# Patient Record
Sex: FEMALE | Race: WHITE | Employment: FULL TIME | URBAN - METROPOLITAN AREA
[De-identification: names, ages, dates, MRNs, and addresses within clinical notes are randomized per-mention and may not be internally consistent; named-entity substitution may affect disease eponyms.]

---

## 2019-07-03 ENCOUNTER — HOSPITAL ENCOUNTER (OUTPATIENT)
Age: 44
Setting detail: SPECIMEN
Discharge: HOME | End: 2019-07-03
Payer: COMMERCIAL

## 2019-07-03 DIAGNOSIS — E55.9 VITAMIN D DEFICIENCY: ICD-10-CM

## 2019-07-03 DIAGNOSIS — Z13.220 SCREENING CHOLESTEROL LEVEL: ICD-10-CM

## 2019-07-03 DIAGNOSIS — E06.3 HASHIMOTO'S DISEASE: ICD-10-CM

## 2019-07-03 LAB
ABSOLUTE EOS #: 0.21 K/UL (ref 0–0.44)
ABSOLUTE IMMATURE GRANULOCYTE: <0.03 K/UL (ref 0–0.3)
ABSOLUTE LYMPH #: 2.1 K/UL (ref 1.1–3.7)
ABSOLUTE MONO #: 0.44 K/UL (ref 0.1–1.2)
ALBUMIN SERPL-MCNC: 4.5 G/DL (ref 3.5–5.2)
ALBUMIN/GLOBULIN RATIO: 1.8 (ref 1–2.5)
ALP BLD-CCNC: 54 U/L (ref 35–104)
ALT SERPL-CCNC: 14 U/L (ref 5–33)
ANION GAP SERPL CALCULATED.3IONS-SCNC: 11 MMOL/L (ref 9–17)
AST SERPL-CCNC: 19 U/L
BASOPHILS # BLD: 1 % (ref 0–2)
BASOPHILS ABSOLUTE: 0.05 K/UL (ref 0–0.2)
BILIRUB SERPL-MCNC: 0.45 MG/DL (ref 0.3–1.2)
BUN BLDV-MCNC: 10 MG/DL (ref 6–20)
BUN/CREAT BLD: NORMAL (ref 9–20)
CALCIUM SERPL-MCNC: 9.3 MG/DL (ref 8.6–10.4)
CHLORIDE BLD-SCNC: 101 MMOL/L (ref 98–107)
CHOLESTEROL/HDL RATIO: 3.2
CHOLESTEROL: 179 MG/DL
CO2: 28 MMOL/L (ref 20–31)
CREAT SERPL-MCNC: 0.61 MG/DL (ref 0.5–0.9)
DIFFERENTIAL TYPE: NORMAL
EOSINOPHILS RELATIVE PERCENT: 4 % (ref 1–4)
GFR AFRICAN AMERICAN: >60 ML/MIN
GFR NON-AFRICAN AMERICAN: >60 ML/MIN
GFR SERPL CREATININE-BSD FRML MDRD: NORMAL ML/MIN/{1.73_M2}
GFR SERPL CREATININE-BSD FRML MDRD: NORMAL ML/MIN/{1.73_M2}
GLUCOSE BLD-MCNC: 79 MG/DL (ref 70–99)
HCT VFR BLD CALC: 42.3 % (ref 36.3–47.1)
HDLC SERPL-MCNC: 56 MG/DL
HEMOGLOBIN: 13.6 G/DL (ref 11.9–15.1)
IMMATURE GRANULOCYTES: 0 %
LDL CHOLESTEROL: 117 MG/DL (ref 0–130)
LYMPHOCYTES # BLD: 41 % (ref 24–43)
MCH RBC QN AUTO: 30.1 PG (ref 25.2–33.5)
MCHC RBC AUTO-ENTMCNC: 32.2 G/DL (ref 28.4–34.8)
MCV RBC AUTO: 93.6 FL (ref 82.6–102.9)
MONOCYTES # BLD: 9 % (ref 3–12)
NRBC AUTOMATED: 0 PER 100 WBC
PDW BLD-RTO: 12.3 % (ref 11.8–14.4)
PLATELET # BLD: 269 K/UL (ref 138–453)
PLATELET ESTIMATE: NORMAL
PMV BLD AUTO: 10.5 FL (ref 8.1–13.5)
POTASSIUM SERPL-SCNC: 4.7 MMOL/L (ref 3.7–5.3)
RBC # BLD: 4.52 M/UL (ref 3.95–5.11)
RBC # BLD: NORMAL 10*6/UL
SEG NEUTROPHILS: 45 % (ref 36–65)
SEGMENTED NEUTROPHILS ABSOLUTE COUNT: 2.37 K/UL (ref 1.5–8.1)
SODIUM BLD-SCNC: 140 MMOL/L (ref 135–144)
THYROXINE, FREE: 1.46 NG/DL (ref 0.93–1.7)
TOTAL PROTEIN: 7 G/DL (ref 6.4–8.3)
TRIGL SERPL-MCNC: 30 MG/DL
TSH SERPL DL<=0.05 MIU/L-ACNC: 3.18 MIU/L (ref 0.3–5)
VITAMIN D 25-HYDROXY: 26.5 NG/ML (ref 30–100)
VLDLC SERPL CALC-MCNC: NORMAL MG/DL (ref 1–30)
WBC # BLD: 5.2 K/UL (ref 3.5–11.3)
WBC # BLD: NORMAL 10*3/UL

## 2020-07-24 ENCOUNTER — HOSPITAL ENCOUNTER (OUTPATIENT)
Age: 45
Setting detail: SPECIMEN
Discharge: HOME | End: 2020-07-24
Payer: COMMERCIAL

## 2020-07-24 LAB
ABSOLUTE EOS #: 0.28 K/UL (ref 0–0.44)
ABSOLUTE IMMATURE GRANULOCYTE: <0.03 K/UL (ref 0–0.3)
ABSOLUTE LYMPH #: 1.93 K/UL (ref 1.1–3.7)
ABSOLUTE MONO #: 0.37 K/UL (ref 0.1–1.2)
ALBUMIN SERPL-MCNC: 4.4 G/DL (ref 3.5–5.2)
ALBUMIN/GLOBULIN RATIO: 1.8 (ref 1–2.5)
ALP BLD-CCNC: 54 U/L (ref 35–104)
ALT SERPL-CCNC: 13 U/L (ref 5–33)
ANION GAP SERPL CALCULATED.3IONS-SCNC: 15 MMOL/L (ref 9–17)
AST SERPL-CCNC: 21 U/L
BASOPHILS # BLD: 1 % (ref 0–2)
BASOPHILS ABSOLUTE: 0.03 K/UL (ref 0–0.2)
BILIRUB SERPL-MCNC: 0.3 MG/DL (ref 0.3–1.2)
BUN BLDV-MCNC: 13 MG/DL (ref 6–20)
BUN/CREAT BLD: NORMAL (ref 9–20)
CALCIUM SERPL-MCNC: 9.2 MG/DL (ref 8.6–10.4)
CHLORIDE BLD-SCNC: 104 MMOL/L (ref 98–107)
CHOLESTEROL/HDL RATIO: 3.1
CHOLESTEROL: 157 MG/DL
CO2: 24 MMOL/L (ref 20–31)
CREAT SERPL-MCNC: 0.58 MG/DL (ref 0.5–0.9)
DIFFERENTIAL TYPE: ABNORMAL
EOSINOPHILS RELATIVE PERCENT: 5 % (ref 1–4)
GFR AFRICAN AMERICAN: >60 ML/MIN
GFR NON-AFRICAN AMERICAN: >60 ML/MIN
GFR SERPL CREATININE-BSD FRML MDRD: NORMAL ML/MIN/{1.73_M2}
GFR SERPL CREATININE-BSD FRML MDRD: NORMAL ML/MIN/{1.73_M2}
GLUCOSE BLD-MCNC: 77 MG/DL (ref 70–99)
HCT VFR BLD CALC: 42.8 % (ref 36.3–47.1)
HDLC SERPL-MCNC: 50 MG/DL
HEMOGLOBIN: 14.1 G/DL (ref 11.9–15.1)
HIV AG/AB: NONREACTIVE
IMMATURE GRANULOCYTES: 0 %
LDL CHOLESTEROL: 99 MG/DL (ref 0–130)
LYMPHOCYTES # BLD: 35 % (ref 24–43)
MCH RBC QN AUTO: 31.3 PG (ref 25.2–33.5)
MCHC RBC AUTO-ENTMCNC: 32.9 G/DL (ref 28.4–34.8)
MCV RBC AUTO: 94.9 FL (ref 82.6–102.9)
MONOCYTES # BLD: 7 % (ref 3–12)
NRBC AUTOMATED: 0 PER 100 WBC
PDW BLD-RTO: 12.5 % (ref 11.8–14.4)
PLATELET # BLD: 275 K/UL (ref 138–453)
PLATELET ESTIMATE: ABNORMAL
PMV BLD AUTO: 10.5 FL (ref 8.1–13.5)
POTASSIUM SERPL-SCNC: 4.4 MMOL/L (ref 3.7–5.3)
RBC # BLD: 4.51 M/UL (ref 3.95–5.11)
RBC # BLD: ABNORMAL 10*6/UL
SEG NEUTROPHILS: 52 % (ref 36–65)
SEGMENTED NEUTROPHILS ABSOLUTE COUNT: 2.92 K/UL (ref 1.5–8.1)
SODIUM BLD-SCNC: 143 MMOL/L (ref 135–144)
THYROXINE, FREE: 1.52 NG/DL (ref 0.93–1.7)
TOTAL PROTEIN: 6.8 G/DL (ref 6.4–8.3)
TRIGL SERPL-MCNC: 40 MG/DL
TSH SERPL DL<=0.05 MIU/L-ACNC: 0.26 MIU/L (ref 0.3–5)
VITAMIN D 25-HYDROXY: 28.5 NG/ML (ref 30–100)
VLDLC SERPL CALC-MCNC: NORMAL MG/DL (ref 1–30)
WBC # BLD: 5.5 K/UL (ref 3.5–11.3)
WBC # BLD: ABNORMAL 10*3/UL

## 2020-11-16 ENCOUNTER — OFFICE VISIT (OUTPATIENT)
Dept: ORTHOPEDIC SURGERY | Age: 45
End: 2020-11-16
Payer: COMMERCIAL

## 2020-11-16 VITALS — WEIGHT: 118 LBS | HEIGHT: 67 IN | TEMPERATURE: 97.9 F | BODY MASS INDEX: 18.52 KG/M2

## 2020-11-16 PROCEDURE — 99203 OFFICE O/P NEW LOW 30 MIN: CPT | Performed by: ORTHOPAEDIC SURGERY

## 2020-11-16 PROCEDURE — G8427 DOCREV CUR MEDS BY ELIG CLIN: HCPCS | Performed by: ORTHOPAEDIC SURGERY

## 2020-11-16 PROCEDURE — 20610 DRAIN/INJ JOINT/BURSA W/O US: CPT | Performed by: ORTHOPAEDIC SURGERY

## 2020-11-16 PROCEDURE — 1036F TOBACCO NON-USER: CPT | Performed by: ORTHOPAEDIC SURGERY

## 2020-11-16 PROCEDURE — G8419 CALC BMI OUT NRM PARAM NOF/U: HCPCS | Performed by: ORTHOPAEDIC SURGERY

## 2020-11-16 PROCEDURE — G8484 FLU IMMUNIZE NO ADMIN: HCPCS | Performed by: ORTHOPAEDIC SURGERY

## 2020-11-16 RX ORDER — LIDOCAINE HYDROCHLORIDE 10 MG/ML
4 INJECTION, SOLUTION INFILTRATION; PERINEURAL ONCE
Status: COMPLETED | OUTPATIENT
Start: 2020-11-16 | End: 2020-11-16

## 2020-11-16 RX ORDER — TRIAMCINOLONE ACETONIDE 40 MG/ML
40 INJECTION, SUSPENSION INTRA-ARTICULAR; INTRAMUSCULAR ONCE
Status: COMPLETED | OUTPATIENT
Start: 2020-11-16 | End: 2020-11-16

## 2020-11-16 RX ADMIN — LIDOCAINE HYDROCHLORIDE 4 ML: 10 INJECTION, SOLUTION INFILTRATION; PERINEURAL at 09:50

## 2020-11-16 RX ADMIN — TRIAMCINOLONE ACETONIDE 40 MG: 40 INJECTION, SUSPENSION INTRA-ARTICULAR; INTRAMUSCULAR at 09:50

## 2020-11-16 NOTE — LETTER
11/16/2020    Tom Breaux, [address] [facility]  [address]  [place]    RE: Clinton Stevenson    Dear Dr. Sary Coleamn,    Thank you for allowing me to participate in the care of Ms. Espinal. I had the opportunity to evaluate the patient on 11/16/2020. Attached you will find my evaluation and recommendations. Thanks again for the confidence you have expressed in me by allowing my participation in the care of your patient. I will keep you apprised of further developments in the patients treatment course as it progresses. If I can be of further assistance in any fashion, please feel free to contact me at your convenience.     Sincerely,        Valeria Appiah  Shoulder and Elbow Surgery

## 2020-11-16 NOTE — PROGRESS NOTES
Orthopedic Knee Encounter Note     Chief complaint: Right knee pain    HPI: Bakari Rodriguez is a 50 y.o. female who presents for evaluation of her right knee which has been ongoing for about a year now. 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 Over time in any event she has noticed what she describes as an \"bubblegum\" in the posterior aspect of the knee that is gradually increase in size and has become more painful has well. Her pain diffusely involves the knee and is usually constant but progressively worsens as the day wears on. She also has associated numbness and tingling. Her pain occasionally radiates down the posterior aspect of her calf. She describes having some locking and popping sensations in the knee as well. Previous treatment:    NSAIDs: Ibuprofen and naproxen prn but tries to avoid this due to her GERD    Injections:  No    Physical therapy: No    Surgeries: None    Review of Systems:     Constitution: no fever or chills   Pain level: 6/10  Musculoskeletal: As noted in the HPI   Neurologic: numbness    Past Medical History  Macarena  has a past medical history of Anxiety, GERD (gastroesophageal reflux disease), Hashimoto's disease, and Migraine. Past Surgical History  Macarena  has a past surgical history that includes Tubal ligation and partial hysterectomy (cervix not removed). Current Medications  Current Outpatient Medications   Medication Sig Dispense Refill    LORazepam (ATIVAN) 0.5 MG tablet Take 1 tablet by mouth 2 times daily as needed for Anxiety for up to 30 days. 10 tablet 0    traMADol (ULTRAM) 50 MG tablet Take 1 tablet by mouth every 6 hours as needed for Pain for up to 7 days. Intended supply: 7 days.  Take lowest dose possible to manage pain 28 tablet 0    cabergoline (DOSTINEX) 0.5 MG tablet TAKE 1/2 TABLET BY MOUTH ONCE WEEKLY 8 tablet 0    levothyroxine (SYNTHROID) 100 MCG tablet TAKE 1 TABLET(100 MCG) BY MOUTH EVERY DAY 90 tablet 0    dexlansoprazole (DEXILANT) 60 MG CPDR delayed release capsule Take 60 mg by mouth daily      cetirizine (ZYRTEC) 10 MG tablet Take 10 mg by mouth daily      citalopram (CELEXA) 20 MG tablet Take 1 tablet by mouth daily (Patient not taking: Reported on 11/16/2020) 30 tablet 3    SUMAtriptan (IMITREX) 100 MG tablet Take 1 tablet by mouth once as needed for Migraine 9 tablet 2     No current facility-administered medications for this visit. Allergies  Allergies have been reviewed. Macarena is allergic to ibuprofen. Social History  Macarena  reports that she has quit smoking. She has never used smokeless tobacco. She reports that she does not drink alcohol or use drugs. Family History  Macarena's family history includes Cancer in her mother. Physical Exam:     Temp 97.9 °F (36.6 °C) (Infrared)   Ht 5' 7\" (1.702 m)   Wt 118 lb (53.5 kg)   BMI 18.48 kg/m²    General Appearance: alert, well appearing, and in no distress  Mental Status: alert, oriented to person, place, and time  Gait: normal  Hips: Good pain-free ROM without crepitation    Knee: Bilateral    Skin: warm and dry, no rash or erythema. Focal swelling over the posterior aspect of the right knee in the popliteal fossa. Palpable mass is present which is nonpulsatile. It is nontender to palpation. Is relatively soft.   Vasculature: 2+ pedal pulses bilaterally  Neuro: Sensation grossly intact to light touch diffusely  Alignment: Normal  Tenderness: Tender to palpation along medial and lateral joint lines of the right knee    ROM: (Degrees)    Right   A P   Left   A P    Extension  0    Extension  0   Flexion   140    Flexion   140      Crepitation  Yes    Crepitation  No      Muscle strength:    Right       Left    Flexion   5    Flexion   5  Extension  5    Extension  5  SLR   5    SLR   5    Extensor lag   n    Extensor lag  n      Special testing:    Right          Left    y    Pain with deep knee flexion   n  y    Patellar grind     n  n    Patellar apprehension    n  n    Patellar glide     n    n    Lachman     n  n    Anterior drawer    n  n    Pivot shift     n  n    Posterior drawer    n  n    Dial test     n  n    Posterolateral drawer    n  n    Posterior Sag     n  n    MCL      n  n    LCL      n    y    Medial joint line tenderness   n  y    Lateral joint line tenderness   n  n    Appley's     n      Imaging:  3 x-rays of the right knee completed on 9/25/2020 were reviewed demonstrating normal joint spaces and normal alignment without obvious fracture, dislocation, or subluxation. Mild peaking of the tibial eminences noted. Impression/Plan:     Mamie Domingo is a 50 y.o. old female with right knee pain and focal swelling over the posterior aspect of her knee that is consistent with Baker's cyst.  I had a discussion with the patient today educating her about this issue and discussed possible etiologies. We discussed treatment options available to her including nonoperative and operative intervention. At this time I recommended proceeding conservatively with an aspiration of her cyst as well as cortisone injection into her knee joint which she was amenable to. This was performed as outlined below. I have her follow-up in my clinic as needed but she was certainly encouraged to return or call at anytime with persistent or worsening symptoms and with any questions and/or concerns. Procedure: right intraarticular knee injection  Following an appropriate discussion with the patient regarding the risks and benefits of the procedure she consented to proceed. her right knee was prepped using chlorhexadine solution. Using aseptic technique and through a posterior approach her Baker's cyst was aspirated. I retrieved approximately 20 cc of clear straw-colored fluid.   The anterior aspect of her knee was then prepped using chlorhexidine and through a superolateral approach, her right knee joint was injected with a 5 cc mixture of 1cc 40mg/ml kenalog and 4 cc of 1% lidocaine without epinephrine. A band aid was applied to the injection site. she tolerated the injection with no immediate adverse reactions.     NA = Not assessed  n = No  y = Yes  SLR = Straight leg raise  MCL = Medial collateral ligament  LCL = Lateral collateral ligament

## 2021-02-08 ENCOUNTER — OFFICE VISIT (OUTPATIENT)
Dept: ORTHOPEDIC SURGERY | Age: 46
End: 2021-02-08
Payer: COMMERCIAL

## 2021-02-08 VITALS — RESPIRATION RATE: 16 BRPM | WEIGHT: 116 LBS | BODY MASS INDEX: 18.21 KG/M2 | TEMPERATURE: 99 F | HEIGHT: 67 IN

## 2021-02-08 DIAGNOSIS — M25.561 RIGHT KNEE PAIN, UNSPECIFIED CHRONICITY: Primary | ICD-10-CM

## 2021-02-08 PROCEDURE — G8484 FLU IMMUNIZE NO ADMIN: HCPCS | Performed by: ORTHOPAEDIC SURGERY

## 2021-02-08 PROCEDURE — 1036F TOBACCO NON-USER: CPT | Performed by: ORTHOPAEDIC SURGERY

## 2021-02-08 PROCEDURE — 99213 OFFICE O/P EST LOW 20 MIN: CPT | Performed by: ORTHOPAEDIC SURGERY

## 2021-02-08 PROCEDURE — G8427 DOCREV CUR MEDS BY ELIG CLIN: HCPCS | Performed by: ORTHOPAEDIC SURGERY

## 2021-02-08 PROCEDURE — G8419 CALC BMI OUT NRM PARAM NOF/U: HCPCS | Performed by: ORTHOPAEDIC SURGERY

## 2021-02-08 NOTE — PROGRESS NOTES
tablet by mouth Daily 90 tablet 1    sertraline (ZOLOFT) 50 MG tablet Take 1 tablet by mouth daily 90 tablet 1    citalopram (CELEXA) 20 MG tablet Take 1 tablet by mouth daily (Patient not taking: Reported on 11/16/2020) 30 tablet 3    cetirizine (ZYRTEC) 10 MG tablet Take 10 mg by mouth daily       No current facility-administered medications for this visit. Allergies  Allergies have been reviewed. Macarena is allergic to ibuprofen. Social History  Macarena  reports that she has quit smoking. She has never used smokeless tobacco. She reports that she does not drink alcohol or use drugs. Family History  Macarena's family history includes Cancer in her mother. Physical Exam:     There were no vitals taken for this visit.    General Appearance: alert, well appearing, and in no distress  Mental Status: alert, oriented to person, place, and time  Gait: normal  Hips: Good pain-free ROM without crepitation    Knee: Bilateral    Skin: warm and dry, no rash or erythema  Vasculature: 2+ pedal pulses bilaterally  Neuro: Sensation grossly intact to light touch diffusely  Alignment: Normal  Tenderness: Tender to palpation along the medial joint line of the right knee    ROM: (Degrees)    Right   A P   Left   A P    Extension  0    Extension  0   Flexion   135    Flexion   140      Crepitation  No    Crepitation  No      Muscle strength:    Right       Left    Flexion   5    Flexion   5  Extension  5    Extension  5  SLR   5    SLR   5    Extensor lag   n    Extensor lag  n      Special testing:    Right          Left    y    Pain with deep knee flexion   n  n    Patellar grind     n  n    Patellar apprehension    n  n    Patellar glide     n    n    Lachman     n  n    Anterior drawer    n  n    Pivot shift     n  n    Posterior drawer    n  n    Dial test     n  n    Posterolateral drawer    n  n    Posterior Sag     n  n    MCL      n  n    LCL      n    y    Medial joint line tenderness   n  n    Lateral joint line tenderness   n  y    Gray's     chano    Impression/Plan:     David Cain is a 50 y.o. old female with recurrent right knee pain. As noted above she is undergone treatment with use of anti-inflammatories in addition to a cortisone injection. At this time we did have a discussion about possible etiologies for her pain. I am concerned that she may have a torn medial meniscus. She has had symptoms on and off for over a year. At this point I recommended proceeding with an MRI study for further evaluation. We will facilitate her getting that study completed and will call with results and additional treatment recommendations as indicated.       NA = Not assessed  n = No  y = Yes  SLR = Straight leg raise  MCL = Medial collateral ligament  LCL = Lateral collateral ligament

## 2021-02-26 ENCOUNTER — TELEPHONE (OUTPATIENT)
Dept: ORTHOPEDIC SURGERY | Age: 46
End: 2021-02-26

## 2021-02-26 ENCOUNTER — HOSPITAL ENCOUNTER (OUTPATIENT)
Dept: MRI IMAGING | Age: 46
Discharge: HOME | End: 2021-02-28
Payer: COMMERCIAL

## 2021-02-26 DIAGNOSIS — M25.561 RIGHT KNEE PAIN, UNSPECIFIED CHRONICITY: ICD-10-CM

## 2021-02-26 PROCEDURE — 73721 MRI JNT OF LWR EXTRE W/O DYE: CPT

## 2021-02-26 NOTE — TELEPHONE ENCOUNTER
Please inform patient I reviewed her MRI and while she does have a fairly large baker's cyst I do not see any significant intra-articular pathology that I would recommend surgery for at this time. Consequently, we can schedule her to come in at her convenience for another injection and to review the MRI images.

## 2021-02-26 NOTE — TELEPHONE ENCOUNTER
Patient completed right knee MRI on 2/26. Results listed below. Please advise on findings and next step for patient. 2 mm focus of near fluid signal intensity seen along the anterior horn of the   medial meniscus may reflect parameniscal cyst formation without discrete   fluid-filled meniscal tear appreciated.       Large Baker's cyst measuring approximately 5 cm in the craniocaudal dimension.       Foci of mild articular cartilage degeneration seen along the central patellar   eminence.

## 2021-03-04 NOTE — TELEPHONE ENCOUNTER
Results reviewed with patient and scheduled a f/u on 3/8 at [facility] at 4:15pm to go over MRI results and to get another injection.

## 2021-03-08 ENCOUNTER — OFFICE VISIT (OUTPATIENT)
Dept: ORTHOPEDIC SURGERY | Age: 46
End: 2021-03-08
Payer: COMMERCIAL

## 2021-03-08 VITALS — TEMPERATURE: 97.7 F | BODY MASS INDEX: 18.21 KG/M2 | WEIGHT: 116 LBS | HEIGHT: 67 IN | RESPIRATION RATE: 14 BRPM

## 2021-03-08 DIAGNOSIS — M71.21 BAKER'S CYST OF KNEE, RIGHT: ICD-10-CM

## 2021-03-08 DIAGNOSIS — M25.561 RIGHT KNEE PAIN, UNSPECIFIED CHRONICITY: Primary | ICD-10-CM

## 2021-03-08 PROCEDURE — 20610 DRAIN/INJ JOINT/BURSA W/O US: CPT | Performed by: ORTHOPAEDIC SURGERY

## 2021-03-08 RX ORDER — TRIAMCINOLONE ACETONIDE 40 MG/ML
40 INJECTION, SUSPENSION INTRA-ARTICULAR; INTRAMUSCULAR ONCE
Status: COMPLETED | OUTPATIENT
Start: 2021-03-08 | End: 2021-03-08

## 2021-03-08 RX ORDER — LIDOCAINE HYDROCHLORIDE 10 MG/ML
3 INJECTION, SOLUTION INFILTRATION; PERINEURAL ONCE
Status: COMPLETED | OUTPATIENT
Start: 2021-03-08 | End: 2021-03-08

## 2021-03-08 RX ADMIN — LIDOCAINE HYDROCHLORIDE 3 ML: 10 INJECTION, SOLUTION INFILTRATION; PERINEURAL at 17:12

## 2021-03-08 RX ADMIN — TRIAMCINOLONE ACETONIDE 40 MG: 40 INJECTION, SUSPENSION INTRA-ARTICULAR; INTRAMUSCULAR at 17:13

## 2021-03-08 NOTE — PROGRESS NOTES
HPI: Ms. Colonel Salas is here today for evaluation of her right knee. She did have an MRI study completed on 2/26/2021. I did review the MRI images with her today and it demonstrates a moderately sized posterior medial popliteal cyst.  Intra-articularly her medial and lateral menisci appear to be intact. No obvious chondral injury. ACL and PCL are both intact. I had a discussion with the patient today with regards to her MRI findings. As noted above there does not appear to be any significant intra-articular pathology. Consequently in discussing treatment options moving forward I did recommend attempting a repeat aspiration of the cyst and this time injecting the cyst with a cortisone. She was amenable to this and had the procedure performed as outlined below. We will have her follow-up my clinic as needed but she may certainly return or call at anytime with questions under concerns. Procedure: right knee popliteal cyst aspiration and injection  Following an appropriate discussion with the patient regarding the risks and benefits of the procedure she consented to proceed. her right knee was prepped using chlorhexadine solution. Using aseptic technique and through a posterior approach, her right knee popliteal cyst was palpated and aspirated retrieving approximately 13 cc of clear straw-colored fluid. The cyst was then injected with a 4 cc mixture of 1cc 40mg/ml kenalog and 3 cc of 1% lidocaine without epinephrine. A band aid was applied to the injection site. she tolerated the injection with no immediate adverse reactions.

## 2021-05-12 ENCOUNTER — HOSPITAL ENCOUNTER (OUTPATIENT)
Age: 46
Setting detail: SPECIMEN
Discharge: HOME | End: 2021-05-12
Payer: COMMERCIAL

## 2021-05-12 DIAGNOSIS — E06.3 HASHIMOTO'S DISEASE: ICD-10-CM

## 2021-05-12 DIAGNOSIS — Z00.00 WELL ADULT HEALTH CHECK: ICD-10-CM

## 2021-05-12 LAB
ALBUMIN SERPL-MCNC: 4.4 G/DL (ref 3.5–5.2)
ALBUMIN/GLOBULIN RATIO: 1.7 (ref 1–2.5)
ALP BLD-CCNC: 66 U/L (ref 35–104)
ALT SERPL-CCNC: 16 U/L (ref 5–33)
ANION GAP SERPL CALCULATED.3IONS-SCNC: 9 MMOL/L (ref 9–17)
AST SERPL-CCNC: 20 U/L
BILIRUB SERPL-MCNC: <0.1 MG/DL (ref 0.3–1.2)
BUN BLDV-MCNC: 8 MG/DL (ref 6–20)
BUN/CREAT BLD: ABNORMAL (ref 9–20)
CALCIUM SERPL-MCNC: 9.2 MG/DL (ref 8.6–10.4)
CHLORIDE BLD-SCNC: 103 MMOL/L (ref 98–107)
CO2: 28 MMOL/L (ref 20–31)
CREAT SERPL-MCNC: 0.49 MG/DL (ref 0.5–0.9)
GFR AFRICAN AMERICAN: >60 ML/MIN
GFR NON-AFRICAN AMERICAN: >60 ML/MIN
GFR SERPL CREATININE-BSD FRML MDRD: ABNORMAL ML/MIN/{1.73_M2}
GFR SERPL CREATININE-BSD FRML MDRD: ABNORMAL ML/MIN/{1.73_M2}
GLUCOSE BLD-MCNC: 73 MG/DL (ref 70–99)
HCT VFR BLD CALC: 39.5 % (ref 36.3–47.1)
HEMOGLOBIN: 12.6 G/DL (ref 11.9–15.1)
MCH RBC QN AUTO: 30.7 PG (ref 25.2–33.5)
MCHC RBC AUTO-ENTMCNC: 31.9 G/DL (ref 28.4–34.8)
MCV RBC AUTO: 96.1 FL (ref 82.6–102.9)
NRBC AUTOMATED: 0 PER 100 WBC
PDW BLD-RTO: 12.1 % (ref 11.8–14.4)
PLATELET # BLD: 309 K/UL (ref 138–453)
PMV BLD AUTO: 9.5 FL (ref 8.1–13.5)
POTASSIUM SERPL-SCNC: 4.7 MMOL/L (ref 3.7–5.3)
RBC # BLD: 4.11 M/UL (ref 3.95–5.11)
SODIUM BLD-SCNC: 140 MMOL/L (ref 135–144)
THYROXINE, FREE: 1.2 NG/DL (ref 0.93–1.7)
TOTAL PROTEIN: 7 G/DL (ref 6.4–8.3)
TSH SERPL DL<=0.05 MIU/L-ACNC: 0.43 MIU/L (ref 0.3–5)
WBC # BLD: 6.7 K/UL (ref 3.5–11.3)

## 2021-05-14 ENCOUNTER — OFFICE VISIT (OUTPATIENT)
Dept: ORTHOPEDIC SURGERY | Age: 46
End: 2021-05-14
Payer: COMMERCIAL

## 2021-05-14 VITALS — HEIGHT: 66 IN | BODY MASS INDEX: 18.64 KG/M2 | WEIGHT: 116 LBS

## 2021-05-14 DIAGNOSIS — M71.21 BAKER'S CYST OF KNEE, RIGHT: Primary | ICD-10-CM

## 2021-05-14 PROCEDURE — G8420 CALC BMI NORM PARAMETERS: HCPCS | Performed by: ORTHOPAEDIC SURGERY

## 2021-05-14 PROCEDURE — 1036F TOBACCO NON-USER: CPT | Performed by: ORTHOPAEDIC SURGERY

## 2021-05-14 PROCEDURE — 99213 OFFICE O/P EST LOW 20 MIN: CPT | Performed by: ORTHOPAEDIC SURGERY

## 2021-05-14 PROCEDURE — 20610 DRAIN/INJ JOINT/BURSA W/O US: CPT | Performed by: ORTHOPAEDIC SURGERY

## 2021-05-14 PROCEDURE — G8427 DOCREV CUR MEDS BY ELIG CLIN: HCPCS | Performed by: ORTHOPAEDIC SURGERY

## 2021-05-14 NOTE — PROGRESS NOTES
Orthopedic Knee Encounter Note     Chief complaint: Right knee pain    HPI: Los Mata is a 50 y.o. female who presents for reevaluation of her right knee. She has been seen several times for this knee now. She has been dealing with a painful popliteal cyst.  She is had an MRI study that is failed to demonstrate any significant intra-articular pathology. During her last visit she did have the cyst aspirated and received a cortisone injection into the joint. She states that the injection helped for only a short while with pain and recurrence of the cyst about a week to 2 after the injection. At this time she continues to have recurrent pain over the posterior aspect of the knee radiating distally into the calf region. She denies having any fevers, chills, sweats or any constitutional symptoms    Previous treatment:    NSAIDs: Ibuprofen, naproxen    Injections: Right knee cortisone injections on 11/16/2020 and 3/8/2021    Physical therapy: No    Surgeries: None    Review of Systems:     Constitution: no fever or chills   Pain level: 5/10  Musculoskeletal: As noted in the HPI   Neurologic: no neurologic symptoms    Past Medical History  Macarena  has a past medical history of Anxiety, GERD (gastroesophageal reflux disease), Hashimoto's disease, and Migraine. Past Surgical History  Macarena  has a past surgical history that includes Tubal ligation and partial hysterectomy (cervix not removed).     Current Medications  Current Outpatient Medications   Medication Sig Dispense Refill    tiZANidine (ZANAFLEX) 4 MG tablet Take 1 tablet by mouth 3 times daily as needed (muscle spasms) 90 tablet 0    omeprazole (PRILOSEC) 20 MG delayed release capsule Take 1 capsule by mouth every morning (before breakfast) 90 capsule 0    LORazepam (ATIVAN) 0.5 MG tablet TAKE ONE TABLET BY MOUTH TWICE A DAY AS NEEDED FOR ANXIETY 30 tablet 0    cabergoline (DOSTINEX) 0.5 MG tablet TAKE 1/2 TABLET BY MOUTH ONCE WEEKLY 3 tablet 11    sertraline (ZOLOFT) 100 MG tablet Take 1 tablet by mouth daily 90 tablet 1    diclofenac sodium (VOLTAREN) 1 % GEL Apply 4 g topically 2 times daily 150 g 1    dexlansoprazole (DEXILANT) 60 MG CPDR delayed release capsule Take 1 capsule by mouth daily 90 capsule 1    levothyroxine (SYNTHROID) 100 MCG tablet Take 1 tablet by mouth Daily 90 tablet 1    cetirizine (ZYRTEC) 10 MG tablet Take 10 mg by mouth daily      SUMAtriptan (IMITREX) 100 MG tablet Take 1 tablet by mouth once as needed for Migraine 12 tablet 5     No current facility-administered medications for this visit. Allergies  Allergies have been reviewed. Macarena is allergic to ibuprofen. Social History  Macarena  reports that she quit smoking about 22 years ago. Her smoking use included cigarettes. She has a 13.00 pack-year smoking history. She has never used smokeless tobacco. She reports that she does not drink alcohol or use drugs. Family History  Macarena's family history includes Cancer in her mother. Physical Exam:     Ht 5' 6\" (1.676 m)   Wt 116 lb (52.6 kg)   BMI 18.72 kg/m²    General Appearance: alert, well appearing, and in no distress  Mental Status: alert, oriented to person, place, and time  Gait: antalgic  Hips: Good pain-free ROM without crepitation    Knee: Bilateral    Skin: warm and dry, no rash or erythema.   No right knee effusion but she does have a large popliteal cyst.  Vasculature: 2+ pedal pulses bilaterally  Neuro: Sensation grossly intact to light touch diffusely  Alignment: Normal  Tenderness: Mildly tender to palpation along the medial joint line of the right knee    ROM: (Degrees)    Right   A P   Left   A P    Extension  0    Extension  0   Flexion   125    Flexion   135      Crepitation  No    Crepitation  No      Muscle strength:    Right       Left    Flexion   5    Flexion   5  Extension  5    Extension  5  SLR   5    SLR   5    Extensor lag   n    Extensor lag  n      Special through a posterior approach, her right knee popliteal cyst was palpated and aspirated retrieving approximately 25 cc of clear straw-colored fluid. A band aid was applied to the injection site.  she tolerated the injection with no immediate adverse reactions.         NA = Not assessed  n = No  y = Yes  SLR = Straight leg raise  MCL = Medial collateral ligament  LCL = Lateral collateral ligament

## 2021-06-01 ENCOUNTER — HOSPITAL ENCOUNTER (OUTPATIENT)
Dept: PREADMISSION TESTING | Age: 46
Discharge: HOME | End: 2021-06-01

## 2021-06-01 ENCOUNTER — HOSPITAL ENCOUNTER (OUTPATIENT)
Age: 46
Discharge: HOME | End: 2021-06-01
Payer: COMMERCIAL

## 2021-06-01 DIAGNOSIS — M71.21 BAKER'S CYST OF KNEE, RIGHT: ICD-10-CM

## 2021-06-01 LAB
ANION GAP SERPL CALCULATED.3IONS-SCNC: 8 MMOL/L (ref 9–17)
BUN BLDV-MCNC: 10 MG/DL (ref 6–20)
BUN/CREAT BLD: ABNORMAL (ref 9–20)
CALCIUM SERPL-MCNC: 8.9 MG/DL (ref 8.6–10.4)
CHLORIDE BLD-SCNC: 100 MMOL/L (ref 98–107)
CO2: 29 MMOL/L (ref 20–31)
CREAT SERPL-MCNC: 0.55 MG/DL (ref 0.5–0.9)
GFR AFRICAN AMERICAN: >60 ML/MIN
GFR NON-AFRICAN AMERICAN: >60 ML/MIN
GFR SERPL CREATININE-BSD FRML MDRD: ABNORMAL ML/MIN/{1.73_M2}
GFR SERPL CREATININE-BSD FRML MDRD: ABNORMAL ML/MIN/{1.73_M2}
GLUCOSE BLD-MCNC: 79 MG/DL (ref 70–99)
HCT VFR BLD CALC: 40.4 % (ref 36.3–47.1)
HEMOGLOBIN: 13.2 G/DL (ref 11.9–15.1)
MCH RBC QN AUTO: 30.7 PG (ref 25.2–33.5)
MCHC RBC AUTO-ENTMCNC: 32.7 G/DL (ref 28.4–34.8)
MCV RBC AUTO: 94 FL (ref 82.6–102.9)
NRBC AUTOMATED: 0 PER 100 WBC
PDW BLD-RTO: 12.3 % (ref 11.8–14.4)
PLATELET # BLD: 257 K/UL (ref 138–453)
PMV BLD AUTO: 10.1 FL (ref 8.1–13.5)
POTASSIUM SERPL-SCNC: 4.1 MMOL/L (ref 3.7–5.3)
RBC # BLD: 4.3 M/UL (ref 3.95–5.11)
SODIUM BLD-SCNC: 137 MMOL/L (ref 135–144)
WBC # BLD: 8.6 K/UL (ref 3.5–11.3)

## 2021-06-01 PROCEDURE — 85027 COMPLETE CBC AUTOMATED: CPT

## 2021-06-01 PROCEDURE — 80048 BASIC METABOLIC PNL TOTAL CA: CPT

## 2021-06-01 PROCEDURE — 36415 COLL VENOUS BLD VENIPUNCTURE: CPT

## 2021-06-11 ENCOUNTER — OFFICE VISIT (OUTPATIENT)
Dept: ORTHOPEDIC SURGERY | Age: 46
End: 2021-06-11

## 2021-06-11 VITALS — HEIGHT: 66 IN | BODY MASS INDEX: 18.64 KG/M2 | WEIGHT: 116 LBS

## 2021-06-11 DIAGNOSIS — M71.21 BAKER'S CYST OF KNEE, RIGHT: ICD-10-CM

## 2021-06-11 DIAGNOSIS — G89.18 POST-OP PAIN: Primary | ICD-10-CM

## 2021-06-11 PROCEDURE — PREOPEXAM PRE-OP EXAM: Performed by: ORTHOPAEDIC SURGERY

## 2021-06-11 RX ORDER — ONDANSETRON 4 MG/1
4 TABLET, FILM COATED ORAL DAILY PRN
Qty: 20 TABLET | Refills: 0 | Status: SHIPPED | OUTPATIENT
Start: 2021-06-11 | End: 2021-12-23

## 2021-06-11 RX ORDER — HYDROCODONE BITARTRATE AND ACETAMINOPHEN 5; 325 MG/1; MG/1
1 TABLET ORAL EVERY 4 HOURS PRN
Qty: 42 TABLET | Refills: 0 | Status: SHIPPED | OUTPATIENT
Start: 2021-06-11 | End: 2021-06-18

## 2021-06-11 NOTE — PROGRESS NOTES
HPI: Ms. Eugene Flanagan is a 64-year-old here today for preoperative visit regarding her right knee. She has painful popliteal cyst and has been treated extensively with several aspirations and cortisone injections as well as prescription strength anti-inflammatories. She is electing at this time to forego continued attempts at conservative management and proceed with surgical intervention by way of a right knee arthroscopic assessment and debridement. We once again discussed in detail what surgery would entail in terms of the procedure, expected outcome and postoperative recovery course. Evaluation of her right knee demonstrates intact skin without warmth or erythema. She does have moderately sized popliteal cyst. She has good range of motion and is nontender to palpation along joint lines. She was provided with her prescriptions for postoperative analgesics. All questions were appropriately answered. She has undergone appropriate preoperative medical clearance. We will proceed with surgery as currently scheduled.

## 2021-06-14 ENCOUNTER — ANESTHESIA EVENT (OUTPATIENT)
Dept: OPERATING ROOM | Age: 46
End: 2021-06-14
Payer: COMMERCIAL

## 2021-06-14 RX ORDER — SODIUM CHLORIDE 0.9 % (FLUSH) 0.9 %
10 SYRINGE (ML) INJECTION PRN
Status: CANCELLED | OUTPATIENT
Start: 2021-06-14

## 2021-06-14 RX ORDER — SODIUM CHLORIDE 0.9 % (FLUSH) 0.9 %
10 SYRINGE (ML) INJECTION EVERY 12 HOURS SCHEDULED
Status: CANCELLED | OUTPATIENT
Start: 2021-06-14

## 2021-06-14 RX ORDER — ACETAMINOPHEN 325 MG/1
1000 TABLET ORAL ONCE
Status: CANCELLED | OUTPATIENT
Start: 2021-06-14 | End: 2021-06-14

## 2021-06-14 RX ORDER — SODIUM CHLORIDE 9 MG/ML
25 INJECTION, SOLUTION INTRAVENOUS PRN
Status: CANCELLED | OUTPATIENT
Start: 2021-06-14

## 2021-06-15 ENCOUNTER — HOSPITAL ENCOUNTER (OUTPATIENT)
Age: 46
Setting detail: OUTPATIENT SURGERY
Discharge: HOME | End: 2021-06-15
Attending: ORTHOPAEDIC SURGERY | Admitting: ORTHOPAEDIC SURGERY
Payer: COMMERCIAL

## 2021-06-15 ENCOUNTER — ANESTHESIA (OUTPATIENT)
Dept: OPERATING ROOM | Age: 46
End: 2021-06-15
Payer: COMMERCIAL

## 2021-06-15 VITALS — DIASTOLIC BLOOD PRESSURE: 78 MMHG | TEMPERATURE: 96.8 F | OXYGEN SATURATION: 100 % | SYSTOLIC BLOOD PRESSURE: 134 MMHG

## 2021-06-15 VITALS
DIASTOLIC BLOOD PRESSURE: 58 MMHG | HEART RATE: 88 BPM | BODY MASS INDEX: 18.28 KG/M2 | HEIGHT: 67 IN | RESPIRATION RATE: 18 BRPM | WEIGHT: 116.5 LBS | TEMPERATURE: 97.9 F | OXYGEN SATURATION: 94 % | SYSTOLIC BLOOD PRESSURE: 134 MMHG

## 2021-06-15 PROCEDURE — 2709999900 HC NON-CHARGEABLE SUPPLY: Performed by: ORTHOPAEDIC SURGERY

## 2021-06-15 PROCEDURE — 29875 ARTHRS KNEE SURG SYNVCT LMTD: CPT | Performed by: ORTHOPAEDIC SURGERY

## 2021-06-15 PROCEDURE — 6360000002 HC RX W HCPCS: Performed by: ORTHOPAEDIC SURGERY

## 2021-06-15 PROCEDURE — 7100000011 HC PHASE II RECOVERY - ADDTL 15 MIN: Performed by: ORTHOPAEDIC SURGERY

## 2021-06-15 PROCEDURE — 3700000000 HC ANESTHESIA ATTENDED CARE: Performed by: ORTHOPAEDIC SURGERY

## 2021-06-15 PROCEDURE — 6360000002 HC RX W HCPCS: Performed by: NURSE ANESTHETIST, CERTIFIED REGISTERED

## 2021-06-15 PROCEDURE — 6360000002 HC RX W HCPCS

## 2021-06-15 PROCEDURE — 2580000003 HC RX 258: Performed by: ANESTHESIOLOGY

## 2021-06-15 PROCEDURE — 2500000003 HC RX 250 WO HCPCS: Performed by: NURSE ANESTHETIST, CERTIFIED REGISTERED

## 2021-06-15 PROCEDURE — 6370000000 HC RX 637 (ALT 250 FOR IP)

## 2021-06-15 PROCEDURE — 3600000014 HC SURGERY LEVEL 4 ADDTL 15MIN: Performed by: ORTHOPAEDIC SURGERY

## 2021-06-15 PROCEDURE — 7100000010 HC PHASE II RECOVERY - FIRST 15 MIN: Performed by: ORTHOPAEDIC SURGERY

## 2021-06-15 PROCEDURE — 3600000004 HC SURGERY LEVEL 4 BASE: Performed by: ORTHOPAEDIC SURGERY

## 2021-06-15 PROCEDURE — 7100000000 HC PACU RECOVERY - FIRST 15 MIN: Performed by: ORTHOPAEDIC SURGERY

## 2021-06-15 PROCEDURE — 3700000001 HC ADD 15 MINUTES (ANESTHESIA): Performed by: ORTHOPAEDIC SURGERY

## 2021-06-15 PROCEDURE — 2580000003 HC RX 258: Performed by: ORTHOPAEDIC SURGERY

## 2021-06-15 PROCEDURE — 2500000003 HC RX 250 WO HCPCS: Performed by: ORTHOPAEDIC SURGERY

## 2021-06-15 RX ORDER — MEPERIDINE HYDROCHLORIDE 50 MG/ML
12.5 INJECTION INTRAMUSCULAR; INTRAVENOUS; SUBCUTANEOUS EVERY 5 MIN PRN
Status: DISCONTINUED | OUTPATIENT
Start: 2021-06-15 | End: 2021-06-15 | Stop reason: HOSPADM

## 2021-06-15 RX ORDER — SODIUM CHLORIDE 0.9 % (FLUSH) 0.9 %
10 SYRINGE (ML) INJECTION PRN
Status: DISCONTINUED | OUTPATIENT
Start: 2021-06-15 | End: 2021-06-15 | Stop reason: HOSPADM

## 2021-06-15 RX ORDER — HYDROCODONE BITARTRATE AND ACETAMINOPHEN 5; 325 MG/1; MG/1
1 TABLET ORAL PRN
Status: COMPLETED | OUTPATIENT
Start: 2021-06-15 | End: 2021-06-15

## 2021-06-15 RX ORDER — DEXAMETHASONE SODIUM PHOSPHATE 10 MG/ML
10 INJECTION, SOLUTION INTRAMUSCULAR; INTRAVENOUS ONCE
Status: COMPLETED | OUTPATIENT
Start: 2021-06-15 | End: 2021-06-15

## 2021-06-15 RX ORDER — LIDOCAINE HYDROCHLORIDE 10 MG/ML
1 INJECTION, SOLUTION EPIDURAL; INFILTRATION; INTRACAUDAL; PERINEURAL
Status: DISCONTINUED | OUTPATIENT
Start: 2021-06-15 | End: 2021-06-15 | Stop reason: HOSPADM

## 2021-06-15 RX ORDER — SODIUM CHLORIDE 9 MG/ML
25 INJECTION, SOLUTION INTRAVENOUS PRN
Status: DISCONTINUED | OUTPATIENT
Start: 2021-06-15 | End: 2021-06-15 | Stop reason: HOSPADM

## 2021-06-15 RX ORDER — PROPOFOL 10 MG/ML
INJECTION, EMULSION INTRAVENOUS PRN
Status: DISCONTINUED | OUTPATIENT
Start: 2021-06-15 | End: 2021-06-15 | Stop reason: SDUPTHER

## 2021-06-15 RX ORDER — SODIUM CHLORIDE 9 MG/ML
INJECTION INTRAVENOUS PRN
Status: DISCONTINUED | OUTPATIENT
Start: 2021-06-15 | End: 2021-06-15 | Stop reason: ALTCHOICE

## 2021-06-15 RX ORDER — SODIUM CHLORIDE, SODIUM LACTATE, POTASSIUM CHLORIDE, CALCIUM CHLORIDE 600; 310; 30; 20 MG/100ML; MG/100ML; MG/100ML; MG/100ML
INJECTION, SOLUTION INTRAVENOUS CONTINUOUS
Status: DISCONTINUED | OUTPATIENT
Start: 2021-06-15 | End: 2021-06-15 | Stop reason: HOSPADM

## 2021-06-15 RX ORDER — BUPIVACAINE HYDROCHLORIDE AND EPINEPHRINE 5; 5 MG/ML; UG/ML
INJECTION, SOLUTION EPIDURAL; INTRACAUDAL; PERINEURAL PRN
Status: DISCONTINUED | OUTPATIENT
Start: 2021-06-15 | End: 2021-06-15 | Stop reason: ALTCHOICE

## 2021-06-15 RX ORDER — EPHEDRINE SULFATE/0.9% NACL/PF 50 MG/5 ML
SYRINGE (ML) INTRAVENOUS PRN
Status: DISCONTINUED | OUTPATIENT
Start: 2021-06-15 | End: 2021-06-15 | Stop reason: SDUPTHER

## 2021-06-15 RX ORDER — ONDANSETRON 2 MG/ML
INJECTION INTRAMUSCULAR; INTRAVENOUS
Status: COMPLETED
Start: 2021-06-15 | End: 2021-06-15

## 2021-06-15 RX ORDER — FENTANYL CITRATE 50 UG/ML
INJECTION, SOLUTION INTRAMUSCULAR; INTRAVENOUS PRN
Status: DISCONTINUED | OUTPATIENT
Start: 2021-06-15 | End: 2021-06-15 | Stop reason: SDUPTHER

## 2021-06-15 RX ORDER — ONDANSETRON 2 MG/ML
INJECTION INTRAMUSCULAR; INTRAVENOUS PRN
Status: DISCONTINUED | OUTPATIENT
Start: 2021-06-15 | End: 2021-06-15 | Stop reason: SDUPTHER

## 2021-06-15 RX ORDER — BUPIVACAINE HYDROCHLORIDE AND EPINEPHRINE 5; 5 MG/ML; UG/ML
INJECTION, SOLUTION PERINEURAL
Status: DISCONTINUED
Start: 2021-06-15 | End: 2021-06-15 | Stop reason: HOSPADM

## 2021-06-15 RX ORDER — ONDANSETRON 2 MG/ML
4 INJECTION INTRAMUSCULAR; INTRAVENOUS
Status: COMPLETED | OUTPATIENT
Start: 2021-06-15 | End: 2021-06-15

## 2021-06-15 RX ORDER — MEPERIDINE HYDROCHLORIDE 50 MG/ML
INJECTION INTRAMUSCULAR; INTRAVENOUS; SUBCUTANEOUS
Status: DISCONTINUED
Start: 2021-06-15 | End: 2021-06-15 | Stop reason: HOSPADM

## 2021-06-15 RX ORDER — ACETAMINOPHEN 500 MG
TABLET ORAL
Status: COMPLETED
Start: 2021-06-15 | End: 2021-06-15

## 2021-06-15 RX ORDER — HYDRALAZINE HYDROCHLORIDE 20 MG/ML
5 INJECTION INTRAMUSCULAR; INTRAVENOUS EVERY 10 MIN PRN
Status: DISCONTINUED | OUTPATIENT
Start: 2021-06-15 | End: 2021-06-15 | Stop reason: HOSPADM

## 2021-06-15 RX ORDER — ACETAMINOPHEN 500 MG
1000 TABLET ORAL ONCE
Status: COMPLETED | OUTPATIENT
Start: 2021-06-15 | End: 2021-06-15

## 2021-06-15 RX ORDER — HYDROCODONE BITARTRATE AND ACETAMINOPHEN 5; 325 MG/1; MG/1
TABLET ORAL
Status: COMPLETED
Start: 2021-06-15 | End: 2021-06-15

## 2021-06-15 RX ORDER — PROMETHAZINE HYDROCHLORIDE 25 MG/ML
6.25 INJECTION, SOLUTION INTRAMUSCULAR; INTRAVENOUS
Status: DISCONTINUED | OUTPATIENT
Start: 2021-06-15 | End: 2021-06-15 | Stop reason: HOSPADM

## 2021-06-15 RX ORDER — MORPHINE SULFATE 1 MG/ML
1 INJECTION, SOLUTION EPIDURAL; INTRATHECAL; INTRAVENOUS EVERY 5 MIN PRN
Status: DISCONTINUED | OUTPATIENT
Start: 2021-06-15 | End: 2021-06-15 | Stop reason: HOSPADM

## 2021-06-15 RX ORDER — GLYCOPYRROLATE 1 MG/5 ML
SYRINGE (ML) INTRAVENOUS PRN
Status: DISCONTINUED | OUTPATIENT
Start: 2021-06-15 | End: 2021-06-15 | Stop reason: SDUPTHER

## 2021-06-15 RX ORDER — MIDAZOLAM HYDROCHLORIDE 1 MG/ML
INJECTION INTRAMUSCULAR; INTRAVENOUS PRN
Status: DISCONTINUED | OUTPATIENT
Start: 2021-06-15 | End: 2021-06-15 | Stop reason: SDUPTHER

## 2021-06-15 RX ORDER — SODIUM CHLORIDE 9 MG/ML
INJECTION INTRAVENOUS
Status: DISCONTINUED
Start: 2021-06-15 | End: 2021-06-15 | Stop reason: HOSPADM

## 2021-06-15 RX ORDER — DIPHENHYDRAMINE HYDROCHLORIDE 50 MG/ML
12.5 INJECTION INTRAMUSCULAR; INTRAVENOUS
Status: DISCONTINUED | OUTPATIENT
Start: 2021-06-15 | End: 2021-06-15 | Stop reason: HOSPADM

## 2021-06-15 RX ORDER — HYDROCODONE BITARTRATE AND ACETAMINOPHEN 5; 325 MG/1; MG/1
2 TABLET ORAL PRN
Status: COMPLETED | OUTPATIENT
Start: 2021-06-15 | End: 2021-06-15

## 2021-06-15 RX ORDER — DEXAMETHASONE SODIUM PHOSPHATE 10 MG/ML
INJECTION, SOLUTION INTRAMUSCULAR; INTRAVENOUS
Status: COMPLETED
Start: 2021-06-15 | End: 2021-06-15

## 2021-06-15 RX ORDER — LIDOCAINE HYDROCHLORIDE 10 MG/ML
INJECTION, SOLUTION EPIDURAL; INFILTRATION; INTRACAUDAL; PERINEURAL PRN
Status: DISCONTINUED | OUTPATIENT
Start: 2021-06-15 | End: 2021-06-15 | Stop reason: SDUPTHER

## 2021-06-15 RX ORDER — FENTANYL CITRATE 50 UG/ML
25 INJECTION, SOLUTION INTRAMUSCULAR; INTRAVENOUS EVERY 5 MIN PRN
Status: DISCONTINUED | OUTPATIENT
Start: 2021-06-15 | End: 2021-06-15 | Stop reason: HOSPADM

## 2021-06-15 RX ORDER — SODIUM CHLORIDE 0.9 % (FLUSH) 0.9 %
10 SYRINGE (ML) INJECTION EVERY 12 HOURS SCHEDULED
Status: DISCONTINUED | OUTPATIENT
Start: 2021-06-15 | End: 2021-06-15 | Stop reason: HOSPADM

## 2021-06-15 RX ADMIN — HYDROCODONE BITARTRATE AND ACETAMINOPHEN 1 TABLET: 5; 325 TABLET ORAL at 09:20

## 2021-06-15 RX ADMIN — SODIUM CHLORIDE, POTASSIUM CHLORIDE, SODIUM LACTATE AND CALCIUM CHLORIDE: 600; 310; 30; 20 INJECTION, SOLUTION INTRAVENOUS at 06:33

## 2021-06-15 RX ADMIN — DEXAMETHASONE SODIUM PHOSPHATE 10 MG: 10 INJECTION INTRAMUSCULAR; INTRAVENOUS at 07:41

## 2021-06-15 RX ADMIN — LIDOCAINE HYDROCHLORIDE 50 MG: 10 INJECTION, SOLUTION EPIDURAL; INFILTRATION; INTRACAUDAL; PERINEURAL at 07:26

## 2021-06-15 RX ADMIN — Medication 0.5 MG: at 08:43

## 2021-06-15 RX ADMIN — HYDROMORPHONE HYDROCHLORIDE 0.5 MG: 1 INJECTION, SOLUTION INTRAMUSCULAR; INTRAVENOUS; SUBCUTANEOUS at 08:43

## 2021-06-15 RX ADMIN — Medication 1000 MG: at 06:25

## 2021-06-15 RX ADMIN — HYDROMORPHONE HYDROCHLORIDE 0.5 MG: 1 INJECTION, SOLUTION INTRAMUSCULAR; INTRAVENOUS; SUBCUTANEOUS at 08:29

## 2021-06-15 RX ADMIN — Medication 10 MG: at 07:35

## 2021-06-15 RX ADMIN — FENTANYL CITRATE 50 MCG: 50 INJECTION, SOLUTION INTRAMUSCULAR; INTRAVENOUS at 07:26

## 2021-06-15 RX ADMIN — Medication 0.5 MG: at 08:29

## 2021-06-15 RX ADMIN — FENTANYL CITRATE 25 MCG: 50 INJECTION, SOLUTION INTRAMUSCULAR; INTRAVENOUS at 07:54

## 2021-06-15 RX ADMIN — ONDANSETRON 4 MG: 2 INJECTION INTRAMUSCULAR; INTRAVENOUS at 08:28

## 2021-06-15 RX ADMIN — ONDANSETRON 4 MG: 2 INJECTION INTRAMUSCULAR; INTRAVENOUS at 07:52

## 2021-06-15 RX ADMIN — ACETAMINOPHEN 1000 MG: 500 TABLET ORAL at 06:25

## 2021-06-15 RX ADMIN — CEFAZOLIN 2000 MG: 10 INJECTION, POWDER, FOR SOLUTION INTRAVENOUS at 07:31

## 2021-06-15 RX ADMIN — PROPOFOL 120 MG: 10 INJECTION, EMULSION INTRAVENOUS at 07:26

## 2021-06-15 RX ADMIN — FENTANYL CITRATE 25 MCG: 50 INJECTION, SOLUTION INTRAMUSCULAR; INTRAVENOUS at 07:49

## 2021-06-15 RX ADMIN — Medication 0.2 MG: at 07:38

## 2021-06-15 RX ADMIN — MIDAZOLAM HYDROCHLORIDE 2 MG: 1 INJECTION, SOLUTION INTRAMUSCULAR; INTRAVENOUS at 07:22

## 2021-06-15 ASSESSMENT — PULMONARY FUNCTION TESTS
PIF_VALUE: 2
PIF_VALUE: 12
PIF_VALUE: 12
PIF_VALUE: 1
PIF_VALUE: 13
PIF_VALUE: 1
PIF_VALUE: 1
PIF_VALUE: 12
PIF_VALUE: 13
PIF_VALUE: 12
PIF_VALUE: 4
PIF_VALUE: 12
PIF_VALUE: 1
PIF_VALUE: 12
PIF_VALUE: 13
PIF_VALUE: 10
PIF_VALUE: 1
PIF_VALUE: 12
PIF_VALUE: 13
PIF_VALUE: 11
PIF_VALUE: 12
PIF_VALUE: 12
PIF_VALUE: 13
PIF_VALUE: 13
PIF_VALUE: 12
PIF_VALUE: 1
PIF_VALUE: 4
PIF_VALUE: 12
PIF_VALUE: 13
PIF_VALUE: 13
PIF_VALUE: 12
PIF_VALUE: 12
PIF_VALUE: 13
PIF_VALUE: 12
PIF_VALUE: 11
PIF_VALUE: 12
PIF_VALUE: 12
PIF_VALUE: 10
PIF_VALUE: 12
PIF_VALUE: 1
PIF_VALUE: 2

## 2021-06-15 ASSESSMENT — PAIN - FUNCTIONAL ASSESSMENT: PAIN_FUNCTIONAL_ASSESSMENT: 0-10

## 2021-06-15 ASSESSMENT — PAIN SCALES - GENERAL
PAINLEVEL_OUTOF10: 0
PAINLEVEL_OUTOF10: 7
PAINLEVEL_OUTOF10: 4
PAINLEVEL_OUTOF10: 6
PAINLEVEL_OUTOF10: 8

## 2021-06-15 NOTE — OP NOTE
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 old female who presented with with right knee pain localized to posterior aspect of her knee associated with sizable popliteal cyst.  Having failed multiple attempts at managing this conservatively and following a discussion with the patient regarding both non-operative and operative treatment options, they consented to proceed with right knee arthroscopic cyst valve debridement. she came to this decision after demonstrating an understanding of our discussion regarding details of the procedure, risks and benefits, expected outcome, and postoperative course. Operative technique: Following appropriate identification of the patient and her operative extremity, consent was reviewed with the patient and her operative extremity was signed. she was wheeled to the operating room where she finished a course of pre-operative antibiotic prophylaxis by way of 2 g of IV Ancef. The anesthesia service administered a general anesthetic and secured her airway using an LMA. All bony prominences were appropriately padded and the patient was secured to the operative table in a supine position. A well padded pneumatic tourniquet was applied to the proximal aspect of the patient's right thigh.  The patient's operative extremity was prepped and draped in a standard sterile fashion and a time out was performed during which the correct patient, operative Stable

## 2021-06-15 NOTE — ANESTHESIA PRE PROCEDURE
Department of Anesthesiology  Preprocedure Note       Name:  Tashi Amaral   Age:  50 y.o.  :  1975                                          MRN:  [id number]         Date:  6/15/2021      Surgeon: Coreen Wilcox):  Vy Ch MD    Procedure: Procedure(s):  RIGHT KNEE ARTHROSCOPY WITH CYST DEBRIDEMENT    Medications prior to admission:   Prior to Admission medications    Medication Sig Start Date End Date Taking? Authorizing Provider   HYDROcodone-acetaminophen (NORCO) 5-325 MG per tablet Take 1 tablet by mouth every 4 hours as needed for Pain for up to 7 days.  21  Vy Ch MD   ondansetron (ZOFRAN) 4 MG tablet Take 1 tablet by mouth daily as needed for Nausea or Vomiting 21   Vy Ch MD   levothyroxine (SYNTHROID) 100 MCG tablet Take 1 tablet by mouth Daily 21   Benjamin Ibrahim MD   SUMAtriptan (IMITREX) 100 MG tablet Take 1 tablet by mouth once as needed for Migraine 21  Benjamin Ibrahim MD   tiZANidine (ZANAFLEX) 4 MG tablet Take 1 tablet by mouth 3 times daily as needed (muscle spasms) 21   Benjamin Ibrahim MD   omeprazole (PRILOSEC) 20 MG delayed release capsule Take 1 capsule by mouth every morning (before breakfast) 21   Benjamin Ibrahim MD   cabergoline (DOSTINEX) 0.5 MG tablet TAKE 1/2 TABLET BY MOUTH ONCE WEEKLY 21   Rita Colon APRN - CNP   sertraline (ZOLOFT) 100 MG tablet Take 1 tablet by mouth daily 21   Benjamin Ibrahim MD   diclofenac sodium (VOLTAREN) 1 % GEL Apply 4 g topically 2 times daily 21   Vy Ch MD   dexlansoprazole (DEXILANT) 60 MG CPDR delayed release capsule Take 1 capsule by mouth daily 21  ARJUN Guan - CNP   cetirizine (ZYRTEC) 10 MG tablet Take 10 mg by mouth daily    Historical Provider, MD       Current medications:    Current Facility-Administered Medications   Medication Dose Route Frequency Provider Last Rate Last Admin    lactated ringers infusion   Intravenous Continuous Susan Hager MD        sodium chloride flush 0.9 % injection 10 mL  10 mL Intravenous 2 times per day Susan Hager MD        sodium chloride flush 0.9 % injection 10 mL  10 mL Intravenous PRN Susan Hager MD        0.9 % sodium chloride infusion  25 mL Intravenous PRN Susan Hager MD        lidocaine PF 1 % injection 1 mL  1 mL Intradermal Once PRN Susan Hager MD        0.9 % sodium chloride infusion  25 mL Intravenous PRN Oskar Simon MD        acetaminophen (TYLENOL) tablet 1,000 mg  1,000 mg Oral Once Oskar Simon MD        ceFAZolin (ANCEF) 2000 mg in dextrose 5 % 50 mL IVPB  2,000 mg Intravenous On Call to [address] [place] [address], MD        dexamethasone (PF) (DECADRON) injection 10 mg  10 mg Intravenous Once Oskar Simon MD        sodium chloride flush 0.9 % injection 10 mL  10 mL Intravenous 2 times per day Oskar Simon MD        sodium chloride flush 0.9 % injection 10 mL  10 mL Intravenous PRN Oskar Simon MD        ceFAZolin (ANCEF) IVPB             dexamethasone (PF) (DECADRON) 10 MG/ML injection             acetaminophen (TYLENOL) 500 MG tablet                Allergies:     Allergies   Allergen Reactions    Ibuprofen Nausea And Vomiting, Nausea Only and Other (See Comments)     Gi issues (patient has GERD)         Problem List:    Patient Active Problem List   Diagnosis Code    Hashimoto's disease E06.3    GERD (gastroesophageal reflux disease) K21.9    Anxiety F41.9    Migraine G43.909    Baker's cyst of knee, right M71.21       Past Medical History:        Diagnosis Date    Anxiety     GERD (gastroesophageal reflux disease)     Hashimoto's disease     Migraine        Past Surgical History:        Procedure Laterality Date    HYSTERECTOMY      PARTIAL HYSTERECTOMY      TUBAL LIGATION      UPPER GASTROINTESTINAL ENDOSCOPY         Social History:    Social History     Tobacco Use    Smoking status: Former Smoker     Packs/day: 1.00 Years: 13.00     Pack years: 13.00     Types: Cigarettes     Quit date: 36     Years since quittin.4    Smokeless tobacco: Never Used   Substance Use Topics    Alcohol use: No                                Counseling given: Not Answered      Vital Signs (Current):   Vitals:    06/15/21 0601   Weight: 116 lb 8 oz (52.8 kg)   Height: 5' 7\" (1.702 m)                                              BP Readings from Last 3 Encounters:   21 120/80   19 100/62   19 110/62       NPO Status:                                                                                 BMI:   Wt Readings from Last 3 Encounters:   06/15/21 116 lb 8 oz (52.8 kg)   21 116 lb (52.6 kg)   21 116 lb (52.6 kg)     Body mass index is 18.25 kg/m². CBC:   Lab Results   Component Value Date    WBC 8.6 2021    RBC 4.30 2021    HGB 13.2 2021    HCT 40.4 2021    MCV 94.0 2021    RDW 12.3 2021     2021       CMP:   Lab Results   Component Value Date     2021    K 4.1 2021     2021    CO2 29 2021    BUN 10 2021    CREATININE 0.55 2021    GFRAA >60 2021    LABGLOM >60 2021    GLUCOSE 79 2021    PROT 7.0 2021    CALCIUM 8.9 2021    BILITOT <0.10 2021    ALKPHOS 66 2021    AST 20 2021    ALT 16 2021       POC Tests: No results for input(s): POCGLU, POCNA, POCK, POCCL, POCBUN, POCHEMO, POCHCT in the last 72 hours.     Coags: No results found for: PROTIME, INR, APTT    HCG (If Applicable): No results found for: PREGTESTUR, PREGSERUM, HCG, HCGQUANT     ABGs: No results found for: PHART, PO2ART, GNV3JLY, DVW1ZMQ, BEART, A8ZURBBV     Type & Screen (If Applicable):  No results found for: LABABO, LABRH    Drug/Infectious Status (If Applicable):  No results found for: HIV, HEPCAB    COVID-19 Screening (If Applicable): No results found for: COVID19        Anesthesia Evaluation Patient summary reviewed and Nursing notes reviewed no history of anesthetic complications:   Airway: Mallampati: I  TM distance: >3 FB   Neck ROM: full  Mouth opening: > = 3 FB Dental: normal exam         Pulmonary:normal exam  breath sounds clear to auscultation                             Cardiovascular:            Rhythm: regular  Rate: normal                    Neuro/Psych:   (+) headaches: migraine headaches, depression/anxiety             GI/Hepatic/Renal:   (+) GERD: no interval change,           Endo/Other:                      ROS comment: Hashimoto's disease Abdominal:       Abdomen: soft. Vascular:                                      Anesthesia Plan      general     ASA 2       Induction: intravenous. MIPS: Postoperative opioids intended and Prophylactic antiemetics administered. Anesthetic plan and risks discussed with patient.                       Tawny Cazares MD   6/15/2021

## 2021-06-15 NOTE — ANESTHESIA POSTPROCEDURE EVALUATION
Department of Anesthesiology  Postprocedure Note    Patient: Debra Blood  MRN: [id number]  YOB: 1975  Date of evaluation: 6/15/2021  Time:  8:31 AM     Procedure Summary     Date: 06/15/21 Room / Location: [facility] 01 / [address] [place] [address]    Anesthesia Start: 5297 Anesthesia Stop: 0815    Procedure: RIGHT KNEE ARTHROSCOPY WITH CYST DEBRIDEMENT (Right Knee) Diagnosis: (RIGHT KNEE CYST)    Surgeons: Hope Loving MD Responsible Provider: Elizabeth Schwartz MD    Anesthesia Type: general ASA Status: 2          Anesthesia Type: general    Kathi Phase I: Kathi Score: 8    Kathi Phase II:      Last vitals: Reviewed and per EMR flowsheets.        Anesthesia Post Evaluation    Patient location during evaluation: PACU  Patient participation: complete - patient participated  Level of consciousness: awake and alert  Airway patency: patent  Nausea & Vomiting: no nausea and no vomiting  Complications: no  Cardiovascular status: hemodynamically stable  Respiratory status: nasal cannula and spontaneous ventilation  Hydration status: euvolemic

## 2021-06-15 NOTE — BRIEF OP NOTE
Brief Postoperative Note      Patient: Alvarez Millard  YOB: 1975  MRN: [id number]    Date of Procedure: 6/15/2021    Pre-Op Diagnosis: RIGHT KNEE CYST    Post-Op Diagnosis: Same       Procedure(s):  RIGHT KNEE ARTHROSCOPY WITH CYST DEBRIDEMENT    Surgeon(s):  Levy Wagner MD    Assistant:  Deborah Sawant    Anesthesia: General    Estimated Blood Loss (mL): Minimal    Complications: None    Specimens:   * No specimens in log *    Implants:  * No implants in log *      Drains: * No LDAs found *    Findings: See operative report    Electronically signed by Levy Wagner MD on 6/15/2021 at 8:11 AM

## 2021-06-15 NOTE — H&P
Update History & Physical    The patient's History and Physical of June 8, 2021 was reviewed with the patient and I examined the patient. There was no change. The surgical site was confirmed by the patient and me. Plan: The risks, benefits, expected outcome, and alternative to the recommended procedure have been discussed with the patient. Patient understands and wants to proceed with the procedure.      Electronically signed by Justus John MD on 6/15/2021 at 6:49 AM

## 2021-06-16 ENCOUNTER — TELEPHONE (OUTPATIENT)
Dept: ORTHOPEDIC SURGERY | Age: 46
End: 2021-06-16

## 2021-06-16 NOTE — TELEPHONE ENCOUNTER
Patient called in concerned as she had knee surgery yesterday and was not given anything to change her dressing. PT has some questions please advise and address thank you!

## 2021-06-17 NOTE — TELEPHONE ENCOUNTER
Spoke with patient about her dressing concerns. She states that someone called her yesterday and informed her to purchase waterproof bandages. She states that all her questions have been answered.

## 2021-06-28 ENCOUNTER — OFFICE VISIT (OUTPATIENT)
Dept: ORTHOPEDIC SURGERY | Age: 46
End: 2021-06-28

## 2021-06-28 VITALS — WEIGHT: 112 LBS | HEIGHT: 67 IN | BODY MASS INDEX: 17.58 KG/M2

## 2021-06-28 DIAGNOSIS — Z98.890 STATUS POST ARTHROSCOPIC SURGERY OF RIGHT KNEE: Primary | ICD-10-CM

## 2021-06-28 PROCEDURE — 99024 POSTOP FOLLOW-UP VISIT: CPT | Performed by: ORTHOPAEDIC SURGERY

## 2021-06-28 NOTE — PROGRESS NOTES
Procedure: Right knee arthroscopic Baker's cyst debridement  Date of procedure: 6/15/2021    HPI: Ms. Alonzo Gambino is a 77-year-old who is approximately 2 weeks status post the aforementioned procedure. She indicates that she is doing relatively well. She states that she did a little too much a few days ago and had diffuse swelling about the knee but it this got better soon as she was able to rest.  She still has some discomfort in her knee that she rates as a 3/10. She also describes having some numbness and tingling over the kneecap anteriorly. She denies having any fevers, chills, sweats or any constitutional symptoms. Physical examination:  Evaluation patient's right knee and lower extremity demonstrates her portal incisions to be clean, dry, intact and healing appropriately. Mild diffuse swelling noted. Sensation is grossly intact light touch in all dermatomes and she has a 2+ pedal pulses with brisk capillary refill in her toes. Impression and plan: Ms. Alonzo Gambino is a 77-year-old who is approximately 2 weeks status post a right knee arthroscopic Baker's cyst debridement. She is doing fairly well at this time. Her sutures were taken out and Steri-Strips and clean dressing applied. She may now get her incisions wet in the shower but was instructed to avoid submerging them in a bath or any body of water for another 4 weeks. 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 No meniscal tears. At this time again to get her started in some formal physical therapy and a prescription was provided. She may continue weightbearing as tolerated. I will see her back for reevaluation in 4 weeks but she was encouraged to return or call earlier with any questions or concerns.

## 2021-06-29 DIAGNOSIS — M25.569 ACUTE KNEE PAIN, UNSPECIFIED LATERALITY: Primary | ICD-10-CM

## 2021-06-29 NOTE — TELEPHONE ENCOUNTER
Patient calling in, she was seen yesterday and she thought you was sending a script for her into [facility] on [facility], but nothing was sent yesterday. Your note: At this time again to get her started in some formal physical therapy and a prescription was provided.

## 2021-06-30 NOTE — TELEPHONE ENCOUNTER
Pt called back requesting a refill of her Norco for the pain that she has been having. She stated that she forgot to ask Dr. Eusebio Mckeon during the appointment on the 28th but later called back. She was very frustrated that it has taken this long to get medication to help relieve the pain.

## 2021-07-01 ENCOUNTER — HOSPITAL ENCOUNTER (OUTPATIENT)
Dept: PHYSICAL THERAPY | Facility: CLINIC | Age: 46
Setting detail: THERAPIES SERIES
Discharge: HOME | End: 2021-07-01
Payer: COMMERCIAL

## 2021-07-01 RX ORDER — HYDROCODONE BITARTRATE AND ACETAMINOPHEN 5; 325 MG/1; MG/1
1 TABLET ORAL EVERY 4 HOURS PRN
Qty: 30 TABLET | Refills: 0 | Status: SHIPPED | OUTPATIENT
Start: 2021-07-01 | End: 2021-07-08

## 2021-07-01 NOTE — TELEPHONE ENCOUNTER
Called pt and told her that Dr Dave Search additional pain meds. Those will be sent over to her pharmacy.

## 2021-07-01 NOTE — FLOWSHEET NOTE
[] [address] - [facility]EL[facility] &  Therapy  [address]    [phone]  F: [phone]   [x] [address] [facility] [address]   [address]  P: [phone]  F: [phone]  [] [address]  [address]  P: [phone]  F: [phone] [] [address]  P: [phone]  F: [phone]  [] [address]  [facility]   [address]   [place]: [phone]  F: [phone]   [] [address]  [place]: [phone]   F: [phone]     Physical Therapy Cancel/No Show note    Date: 2021  Patient: Halle Limon  : 1975  MRN: [id number]    Cancels/No Shows to date: 1    For today's appointment patient:    [x]  Cancelled    [] Rescheduled appointment    [] No-show     Reason given by patient:    []  Patient ill    []  Conflicting appointment    [] No transportation      [] Conflict with work    [] No reason given    [] Weather related    [] [id number]    [x] Other:      Comments: other obligation came up        [x] Next appointment was confirmed: eval re-scheduled 21    Electronically signed by: Gumaro Coles, PT

## 2021-07-07 ENCOUNTER — HOSPITAL ENCOUNTER (OUTPATIENT)
Dept: PHYSICAL THERAPY | Facility: CLINIC | Age: 46
Setting detail: THERAPIES SERIES
Discharge: HOME | End: 2021-07-07
Payer: COMMERCIAL
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
## 2021-07-12 ENCOUNTER — HOSPITAL ENCOUNTER (OUTPATIENT)
Dept: PHYSICAL THERAPY | Facility: CLINIC | Age: 46
Setting detail: THERAPIES SERIES
Discharge: HOME | End: 2021-07-12
Payer: COMMERCIAL

## 2021-07-12 PROCEDURE — 97110 THERAPEUTIC EXERCISES: CPT

## 2021-07-12 PROCEDURE — 97161 PT EVAL LOW COMPLEX 20 MIN: CPT

## 2021-07-12 NOTE — CONSULTS
[] [address]  - [facility] &  Therapy  [address]  [phone]  F: [phone] [x] [email or web address]  [place] [address]   [address]  P: [phone]  F: [phone] [] [address] &  Therapy  [address]  P: [phone]  F: [phone] [] [address]  P: [phone]  F: [phone] [] [address]  [facility]   [address]   [place]: [phone]  F: [phone]    Physical Therapy Lower Extremity Evaluation    Date:  2021  Patient: Cher Deal  : 1975  MRN: [id number]  Physician: Joseline Adams MD Insurance: [employer] (30v per shannan year)  Medical Diagnosis: Z98.890- Status post arthroscopic surgery of right knee  Rehab Codes: M25.46, M25.66, M79.605, M62.81, R26.89  Onset date: surgery 6/15/21   Next Dr's appt.:  21    Subjective:   CC/HPI:   50 y.o. female presents to physical therapy with R knee pain. Pt had chronic knee pain and was diagnosed with Baker's Cyst on R knee. Pt is now s/p R knee arthroscopy on 6/15/21. Pt notes occasional bucking, popping, locking of R knee following surgery. Pt continues to have difficulty walking, getting up and down from ground level, kneeling and ambulating stairs. Pt notes that pain is worse with knee bending than straightening, increases with WBing, and increases throughout the day with activity. Pt has been managing pain with medications, ice, heat, activity modifications with some relief. Pt was initially ambulating with bilateral crutches following surgery but is now ambulating independently without AD.  Pt has been able to complete most activities with modified independence but does report increased knee pain and soreness with prolonged standing or ambulation such as grocery shopping, vacuuming, heavy household ADLs.      PMHx: [x] Unremarkable               [x] Refer to full medical chart  In EPIC     Tests: [] X-Ray:    [x] MRI:   2 mm focus of near fluid signal intensity seen along the anterior horn of the   medial meniscus may reflect parameniscal cyst formation without discrete   fluid-filled meniscal tear appreciated.       Large Baker's cyst measuring approximately 5 cm in the craniocaudal dimension.       Foci of mild articular cartilage degeneration seen along the central patellar   eminence.          [] Other:     Comorbidities:   [] Obesity [] Dialysis  [x] N/A   [] Asthma/COPD [] Dementia [] Other:   [] Stroke [] Sleep apnea [] Other:   [] Vascular disease [] Rheumatic disease [] Other:       Medications:  [x] Refer to full medical record [] None [] Other:  Allergies:       [x] Refer to full medical record [] None [] Other:    ADL/IADL Previous level of function Current level of function Who currently assists the patient with task   Bathing  [x] Independent  [] Assist [x] Independent  [] Assist    Dress/grooming [x] Independent  [] Assist [x] Independent  [] Assist    Transfer/mobility [x] Independent  [] Assist [x] Independent  [] Assist    Feeding [x] Independent  [] Assist [x] Independent  [] Assist    Toileting [x] Independent  [] Assist [x] Independent  [] Assist    Driving [x] Independent  [] Assist [] Independent  [x] Assist Boyfriend    Housekeeping [x] Independent  [] Assist [] Independent  [x] Assist Boyfriend assists with heavy household chores such as vacuuming   Grocery shop/meal prep [x] Independent  [] Assist [x] Modified Independent  [] Assist Pain during/after ambulating through store     Gait Prior level of function Current level of function    [x] Independent  [] Assist [x] Independent  [] Assist   Device: [x] Independent [x] Independent       Marital Status Lives with boyfriend    Home type Two story    Stairs from outside 5 with railing Stairs inside Full flight with railing B/B upstairs, laundry basement    Employement works at  but is unemployed currently   Job status N/A   Work Activities/duties  N/A   Recreational Activities Working at a , hiking, walking her dog        Pain present? Yes   Location R knee medial-posterior, soreness through R calf   Pain Rating currently 6/10   Pain at worse 10/10   Pain at best 1.5/10   Description of pain Constant, sharp, shooting, stabbing, aching    Altered Sensation Numb superior to R knee   What makes it worse Walking, squatting, standing, prolonged position   What makes it better Ice/heat, medications    Symptom progression Improving   Sleep Occasional difficulty falling or staying asleep             Objective:    STRENGTH    Left Right   Hip Flex 5 4   Ext 4+ 4-   ABD 4+ 4+   ADD     Knee Flex 4+ 4-   Ext 5 4   Ankle DF 5 5   PF 5 4-   INV     EVER              ROM  ° A/P    Left Right   Hip Flex 69 knee straight 40 knee straight   Ext     ER     IR     ABD     ADD     Knee Flex 140 108   Ext 0 lacking 5   Ankle DF 12 5   PF     INV     EVER            TESTS (+/-) Left Right Not Tested   Ant. Drawer   [x]   Post. Drawer   [x]   Lachmans   [x]   Valgus Stress   [x]   Varus Stress   [x]   Lucs   [x]   Apleys Comp.    [x]   Apleys Dist.   [x]   Hip Scouring   [x]   EMAs   [x]   Piriformis   [x]   Christines   [x]   Talor Tilt   [x]   Pat-Fem Grind   [x]      *not tested due to post op R knee arthroscopy     OBSERVATION No Deficit Deficit Not Tested Comments   Posture       Genu Valgus [x] [] []    Genu Varus [x] [] []    Genu Recurvatum [x] [] []    Leg Length Discrp [x] [] []    Palpation [] [x] [] Increased tenderness to R medial/posterior PFJ, quad tendon, increased tone and palpable tenderness through R quad and calf musculature    Sensation [] [x] [] Decreased sensation at R PFJ- suprapatellar region   Edema [] [x] [] R suprapatellar 34.8cm  PFJ 33cm  L suprapatellar= 32cm   PFJ 32 cm   Neurological [x] [] []    Patellar Mobility [] [x] [] Hypomobility globally through R PFJ with palpable crepitus    Patellar Orientation [x] [] []    Gait [] [x] [] Analysis: Antalgic gait pattern, reduced stance time on RLE, unable to achieve full extension throughout gait cycle RLE         FUNCTION Normal Difficult Unable   Sitting [x] [] []   Standing [] [x] []   Ambulation [] [x] []   Groom/Dress [x] [] []   Lift/Carry [] [x] []   Stairs [] [x] []   Bending [] [x] []   Squat [] [x] []   Kneel [] [x] []         BALANCE/PROPRIOCEPTION              [x] Not tested   Single leg stance       R                     L                                PAIN   Eyes open                             Sec. Sec                  . []    Eyes closed                          Sec. Sec                  .[]      *pt unable to accept full weight bearing on RLE to assess SLS       FUNCTIONAL TESTS PAIN NO PAIN COMMENTS   Step Test 4 [] []    6 [] []    8 [] []    Squat [x] [] Decreased ROM due to pain, genu valgus collapse RLE > LLE, unable to accept full WBing on RLE           Flexibility Normal Left tight Right tight   Hip flexor [] [] [x]   quad [] [] [x]   HS [] [] [x]   piriformis [] [] []   ITB [] [] []   gastroc [] [] [x]   Soleus  [] [] []    [] [] []    [] [] []        Ely's - RLE at 70 degrees knee flexion  SLR- RLE 40 degrees   DF limited to 5 degrees    Somatic Dysfunctions Normal Deficit Details   Cervical   [x] []    Thoracic   [x] []    Rib   [x] []    Pelvis   [x] []    Lumbar [x] []    SI   [x] []        Functional Test: LEFI Score: 60% functionally impaired       Comments:      Assessment:    50 y.o. female presents to physical therapy with R knee pain and effusion, limited RLE strength globally, reduced R knee AROM, and gait impairments. These signs and symptoms are consistent with medical diagnosis as patient presents s/p R knee arthroscopy.  These impairments are limiting the patient's ability to work at a , complete heavy household ADLs, walk and hike for recreation. Patient would benefit from skilled physical therapy services in order to: reduce R knee pain and effusion, restore full R knee AROM, and improve RLE strength globally to improve gait and return patient to prior level of function. Problems:    [x] ? Pain: 6-10/10 pain R medial-posterior knee   [x] ? ROM: Reduced R knee AROM globally, reduced flexibility in gastrocs, hamstring, hip flexor, quad   [x] ? Strength: reduced RLE strength globally   [x] ? Function: reduced function indicated by LEFI of 60% impaired   [x] Other: Gait abnormalities including antalgic gait pattern, reduced stance time on RLE, unable to achieve full extension throughout gait cycle RLE      STG: (to be met in 8 treatments)  1. ? Pain: Decrease R knee pain levels to 4/10 or less to improve tolerance to therapeutic exercise progressions. 2. ? ROM: Increase AROM limitations throughout R knee to 0-125 degrees to reduce difficulty with ADLs  a. Demonstrate improved RLE flexibility with R SLR to at least 60 degrees   3. ? Strength: Increase bilateral hip strength to 5/5 globally to improve independence with heavy household chores. 4. Pt to be independent and compliant with Home Exercise Programs      LTG: (to be met in 16 treatments)  1. Improve score on assessment tool LEFI from 60% impaired to 35% impaired or less. 2. Reduce R knee pain levels to 0-1/10 to improve independence with all daily activities. 3. Pt to increase R knee and ankle strength to 5/5 to improve independence with stair climbing and transfers from floor to standing for work. 4. Pt to be able to ambulate at least 150' with improved bilateral step length, heel-toe progression, and full R knee extension through gait cycle.                     Patient goals: return to prior level of function     Rehab Potential:  [x] Good  [] Fair  [] Poor   Suggested Professional Referral: [x] No  [] Yes:  Barriers to Goal Achievement[de-identified]  [x] No  [] Yes:  Domestic Concerns:  [x] No  [] Yes:    Pt. Education:  [x] Plans/Goals, Risks/Benefits discussed  [x] Home exercise program    Method of Education: [x] Verbal  [x] Demo  [x] Written  7/12/21- HEP given of charted exercises, medbridge #[id number]  Comprehension of Education:  [x] Verbalizes understanding. [x] Demonstrates understanding. [x] Needs Review. [] Demonstrates/verbalizes understanding of HEP/Ed previously given. Treatment Plan:  [x] Therapeutic Exercise   55238  [] Iontophoresis: 4 mg/mL Dexamethasone Sodium Phosphate  mAmin  41329   [x] Therapeutic Activity  33131 [x] Vasopneumatic cold with compression  81213    [x] Gait Training   13199 [] Ultrasound   00411   [x] Neuromuscular Re-education  86732 [] Electrical Stimulation Unattended  52611   [x] Manual Therapy  42543 [] Electrical Stimulation Attended  68589   [x] Instruction in HEP  [] Lumbar/Cervical Traction  67386   [] Aquatic Therapy   14798 [x] Cold/hotpack    [] Massage   40683      [] Dry Needling, 1 or 2 muscles  35619   [] Biofeedback, first 15 minutes   35070  [] Biofeedback, additional 15 minutes   23859 [] Dry Needling, 3 or more muscles  48162            [x]  Medication allergies reviewed for use of    Dexamethasone Sodium Phosphate 4mg/ml     with iontophoresis treatments. Pt is not allergic.     Frequency:  2 x/week for 16 visits    Todays Treatment:    Exercises:   Precautions: s/p R knee arthroscopy DOS  6/15/21    Exercise     Reps/ Time Weight/ Level Comments   Quad Sets  20x5\"     SL hip abduction 10x     Supine DF + Inv stretch 3x30\"     Supine heel slides  10x           SLR   Attempted- pt reports shooting pain through R inferior patella                                                          Other:    Specific Instructions for next treatment: restore R knee active ROM, hamsting/gastroc/quad flexibility, bilateral hip and knee strength per knee arthroscopy protocol     Evaluation Complexity:  History (Personal factors, comorbidities) [x] 0 [] 1-2 [] 3+   Exam (limitations, restrictions) [] 1-2 [] 3 [x] 4+   Clinical presentation (progression) [x] Stable [] Evolving  [] Unstable   Decision Making [x] Low [] Moderate [] High    [x] Low Complexity [] Moderate Complexity [] High Complexity       Treatment Charges: Mins Units   [x] Evaluation       [x]  Low       []  Moderate       []  High 35 1   []  Modalities     [x]  Ther Exercise 10 1   []  Manual Therapy     []  Ther Activities     []  Aquatics     []  Vasocompression     []  Other       TOTAL TREATMENT TIME: 45 minutes     Time in: 2:05 pm   Time Out: 2:50 pm    Electronically signed by: Saira Segal PT        Physician Signature:________________________________Date:__________________  By signing above or cosigning this note, I have reviewed this plan of care and certify a need for medically necessary rehabilitation services.      *PLEASE SIGN ABOVE AND FAX BACK ALL PAGES*

## 2021-07-15 ENCOUNTER — HOSPITAL ENCOUNTER (OUTPATIENT)
Dept: PHYSICAL THERAPY | Facility: CLINIC | Age: 46
Setting detail: THERAPIES SERIES
Discharge: HOME | End: 2021-07-15
Payer: COMMERCIAL

## 2021-07-15 PROCEDURE — 97110 THERAPEUTIC EXERCISES: CPT

## 2021-07-15 PROCEDURE — 97016 VASOPNEUMATIC DEVICE THERAPY: CPT

## 2021-07-15 NOTE — FLOWSHEET NOTE
[] [facility]) [facility] &  Therapy  [address]  [phone]  F: [phone] [x] [address]  [place] [address]   [address]  P: [phone]  F: [phone] [] [place]  [address]  P: [phone]  F: [phone] [] [address]  P: [phone]  F: [phone] [] [address]  [facility]   [address]   [place]: [phone]  F: [phone]      Physical Therapy Daily Treatment Note    Date:  7/15/2021  Patient Name:  Kwabena Chandler    :  1975  MRN: [id number]  Physician: Sherif Alexander MD       Insurance: [address] (30v per  year)  Medical Diagnosis: Z98.890- Status post arthroscopic surgery of right knee                     Rehab Codes: M25.46, M25.66, M79.605, M62.81, R26.89  Onset date: surgery 6/15/21                          Next Dr's appt.:  21    Visit# / total visits: ; STGs at visit 8     Cancels/No Shows: 0/0    Subjective:    Pain:  [x] Yes  [] No Location: R knee Pain Rating: (0-10 scale) 4.5/10  Pain altered Tx:  [x] No  [] Yes  Action:    Comments: pt reports knee soreness in the anterior knee.      Objective:  Modalities:   Precautions: s/p R knee arthroscopy DOS  6/15/21  Exercises:  Exercise  Reps/Time Weight/Level Comments    Scifit/Nustep 5min L3          PROM   Flexion and ext         SUPINE      Patella mobs   x     Extension stretching x  Manual    Heel slides  x  Manual    Quad sets 10x     SLR 20x  With quad set to start    HS stretch  3x20\"           4 way ankle             SIDE LYING      Hip abduction  20x           PRONE      Hip ext  2x10     HS curls  20x     Quad/hip flexor stretch  3x20\"                 SEATED       LAQ 20x     March  20x Extension board stretch             STANDING      Calf stretch on slant board 3x20\"     Heel raises  20x     Squats  20x     TKE 10x3\" Black     3 way hip  15xea Lime  Bilateral          Stair flexion stretch       Step ups 20x 6\"    Step downs  20x 6\"                Other:      Treatment Charges: Mins Units   []  Modalities     [x]  Ther Exercise 45 3   []  Manual Therapy     []  Ther Activities     []  Aquatics     [x]  Vasocompression 10 1   []  Other     Total Treatment time 55 4       Assessment: [x] Progressing toward goals. Pt able to complete exercises charted with min increase in R knee pain. Cuing required for stepping exercises and squats to complete with proper form, pt needed correction on valgus dive with knee flexion and reduction of anterior translation. Pt notes max fatigue in the R LE at the end of the session, no increased pain. [] No change. [] Other:  [x] Patient would continue to benefit from skilled physical therapy services in order to: reduce R knee pain and effusion, restore full R knee AROM, and improve RLE strength globally to improve gait and return patient to prior level of function. Problems:    [x]? ? Pain: 6-10/10 pain R medial-posterior knee   [x]? ? ROM: Reduced R knee AROM globally, reduced flexibility in gastrocs, hamstring, hip flexor, quad   [x]? ? Strength: reduced RLE strength globally   [x]? ? Function: reduced function indicated by LEFI of 60% impaired   [x]? Other: Gait abnormalities including antalgic gait pattern, reduced stance time on RLE, unable to achieve full extension throughout gait cycle RLE        STG: (to be met in 8 treatments)  1. ? Pain: Decrease R knee pain levels to 4/10 or less to improve tolerance to therapeutic exercise progressions. 2. ? ROM: Increase AROM limitations throughout R knee to 0-125 degrees to reduce difficulty with ADLs  a. Demonstrate improved RLE flexibility with R SLR to at least 60 degrees   3. ? Strength:  Increase bilateral hip strength to 5/5 globally to improve independence with heavy household chores. 4. Pt to be independent and compliant with Home Exercise Programs        LTG: (to be met in 16 treatments)  1. Improve score on assessment tool LEFI from 60% impaired to 35% impaired or less. 2. Reduce R knee pain levels to 0-1/10 to improve independence with all daily activities. 3. Pt to increase R knee and ankle strength to 5/5 to improve independence with stair climbing and transfers from floor to standing for work. 4. Pt to be able to ambulate at least 150' with improved bilateral step length, heel-toe progression, and full R knee extension through gait cycle.                     Patient goals: return to prior level of function     Pt. Education:  [x] Yes  [] No  [] Reviewed Prior HEP/Ed  Method of Education: [x] Verbal  [x] Demo for charted exercises   [] Written  Comprehension of Education:  [x] Verbalizes understanding. [x] Demonstrates understanding. [x] Needs review. [x] Demonstrates/verbalizes HEP/Ed previously given. Plan: [x] Continue current frequency toward long and short term goals.     [x] Specific Instructions for subsequent treatments: restore R knee active ROM, hamsting/gastroc/quad flexibility, bilateral hip and knee strength per knee arthroscopy protocol       Time In: 5:56pm            Time Out: 6:57pm    Electronically signed by:  Morelia Espinosa PTA

## 2021-07-20 ENCOUNTER — HOSPITAL ENCOUNTER (OUTPATIENT)
Dept: PHYSICAL THERAPY | Facility: CLINIC | Age: 46
Setting detail: THERAPIES SERIES
Discharge: HOME | End: 2021-07-20
Payer: COMMERCIAL

## 2021-07-20 PROCEDURE — 97110 THERAPEUTIC EXERCISES: CPT

## 2021-07-20 PROCEDURE — 97016 VASOPNEUMATIC DEVICE THERAPY: CPT

## 2021-07-20 NOTE — FLOWSHEET NOTE
[] [facility]) - [facility]  [address]  [phone]  F: [phone] [x] [address]  [facility]   [address]  P: [phone]  F: [phone] [] [address] &  Therapy  [address]  P: [phone]  F: [phone] [] [address]  P: [phone]  F: [phone] [] [address]  [facility]   [address]   [place]: [phone]  F: [phone]      Physical Therapy Daily Treatment Note    Date:  2021  Patient Name:  Ron Mistry    :  1975  MRN: [id number]  Physician: Edilberto Vergara MD       Insurance: [address] (30v per  year)  Medical Diagnosis: Z98.890- Status post arthroscopic surgery of right knee                     Rehab Codes: M25.46, M25.66, M79.605, M62.81, R26.89  Onset date: surgery 6/15/21                          Next Dr's appt.:  21    Visit# / total visits: 3/16; STGs at visit 8     Cancels/No Shows: 0/0    Subjective:    Pain:  [x] Yes  [] No Location: R knee Pain Rating: (0-10 scale) not bad/10  Pain altered Tx:  [x] No  [] Yes  Action:    Comments: pt states she was      Objective:  Modalities:   Precautions: s/p R knee arthroscopy DOS  6/15/21  Exercises:  Exercise  Reps/Time Weight/Level Comments    Scifit/Nustep 7min L3          PROM   Flexion and ext         SUPINE      Patella mobs   x     Extension stretching x  Manual    Heel slides  x  Manual    Quad sets 15x5\"     SLR 20x  With quad set to start    HS stretch  3x20\"           4 way ankle             SIDE LYING      Hip abduction  20x           PRONE      Hip ext  2x10     HS curls  20x     Quad/hip flexor stretch  3x20\"                 SEATED       LAQ 20x     March  20x           Extension board stretch compliant with Home Exercise Programs        LTG: (to be met in 16 treatments)  Improve score on assessment tool LEFI from 60% impaired to 35% impaired or less. Reduce R knee pain levels to 0-1/10 to improve independence with all daily activities. Pt to increase R knee and ankle strength to 5/5 to improve independence with stair climbing and transfers from floor to standing for work. Pt to be able to ambulate at least 150' with improved bilateral step length, heel-toe progression, and full R knee extension through gait cycle. Patient goals: return to prior level of function     Pt. Education:  [x] Yes  [] No  [] Reviewed Prior HEP/Ed  Method of Education: [x] Verbal  [x] Demo for charted exercises   [x] Written  Access Code: [email or web address]  URL: [email or web address]  Date: 07/20/2021  Prepared by: Swati Garcia    Exercises  Supine Quad Set - 2 x daily - 7 x weekly - 1 sets - 20 reps - 5sec hold  Supine Hamstring Stretch with Strap - 2 x daily - 7 x weekly - 1 sets - 3 reps - 20sec hold  Supine Active Straight Leg Raise - 2 x daily - 7 x weekly - 1 sets - 20 reps  Sidelying Hip Abduction - 2 x daily - 7 x weekly - 1 sets - 20 reps  Prone Hip Extension - 2 x daily - 7 x weekly - 1 sets - 20 reps  Prone Quadriceps Stretch with Strap - 2 x daily - 7 x weekly - 1 sets - 3 reps - 20sec hold  Prone Knee Flexion AROM - 2 x daily - 7 x weekly - 1 sets - 20 reps  Seated Long Arc Quad - 2 x daily - 7 x weekly - 1 sets - 20 reps  Seated March - 2 x daily - 7 x weekly - 1 sets - 20 reps  Mini Squat - 2 x daily - 7 x weekly - 1 sets - 20 reps  Standing Terminal Knee Extension with Resistance - 2 x daily - 7 x weekly - 1 sets - 20 reps - 5sec hold  Standing Hip Extension with Anchored Resistance - 2 x daily - 7 x weekly - 1 sets - 20 reps  Standing Hip Abduction with Anchored Resistance - 2 x daily - 7 x weekly - 1 sets - 20 reps  Standing Repeated Hip Flexion with Resistance - 2 x daily - 7 x weekly - 1 sets - 20 reps    Comprehension of Education:  [x] Verbalizes understanding. [x] Demonstrates understanding. [x] Needs review. [x] Demonstrates/verbalizes HEP/Ed previously given. Plan: [x] Continue current frequency toward long and short term goals.     [x] Specific Instructions for subsequent treatments: restore R knee active ROM, hamsting/gastroc/quad flexibility, bilateral hip and knee strength per knee arthroscopy protocol       Time In: 5:30pm            Time Out: 6:25pm    Electronically signed by:  Shefali Ann PTA

## 2021-07-22 ENCOUNTER — HOSPITAL ENCOUNTER (OUTPATIENT)
Dept: PHYSICAL THERAPY | Facility: CLINIC | Age: 46
Setting detail: THERAPIES SERIES
Discharge: HOME | End: 2021-07-22
Payer: COMMERCIAL

## 2021-07-22 NOTE — FLOWSHEET NOTE
[] [facility]) [facility] &  Therapy  [address]    [phone]  F: [phone]   [x] [address]  [facility]   [address]  P: [phone]  F: [phone]  [] [place]  [address]  P: [phone]  F: [phone] [] [address]  P: [phone]  F: [phone]  [] [address]  [address] [facility] [address]   [address]   [place]: [phone]  F: [phone]   [] [facility]  [address] [facility] [address]  [place]: [phone]   F: [phone]     Physical Therapy Cancel/No Show note    Date: 2021  Patient: Mamie Domingo  : 1975  MRN: [id number]    Cancels/No Shows to date:     For today's appointment patient:    [x]  Cancelled    [] Rescheduled appointment    [] No-show     Reason given by patient:    [x]  Patient ill    []  Conflicting appointment    [] No transportation      [] Conflict with work    [] No reason given    [] Weather related    [] COVID-19    [] Other:      Comments:        [x] Next appointment was confirmed    Electronically signed by:  Shayla Cordero PT

## 2021-07-27 ENCOUNTER — HOSPITAL ENCOUNTER (OUTPATIENT)
Dept: PHYSICAL THERAPY | Facility: CLINIC | Age: 46
Setting detail: THERAPIES SERIES
Discharge: HOME | End: 2021-07-27
Payer: COMMERCIAL

## 2021-07-27 PROCEDURE — 97110 THERAPEUTIC EXERCISES: CPT

## 2021-07-27 NOTE — FLOWSHEET NOTE
[] [facility]) - [facility]  Therapy  [address]  [phone]  F: [phone] [x] [address]  [address]   [address]  P: [phone]  F: [phone] [] [address]  Therapy  [address]  P: [phone]  F: [phone] [] [address]  P: [phone]  F: [phone] [] [address]  [facility]   [address]   [place]: [phone]  F: [phone]      Physical Therapy Daily Treatment Note    Date:  2021  Patient Name:  Pedro Knowles    :  1975  MRN: [id number]  Physician: Alma Rosa Jain MD       Insurance: [address] (30v per  year)  Medical Diagnosis: Z98.890- Status post arthroscopic surgery of right knee                     Rehab Codes: M25.46, M25.66, M79.605, M62.81, R26.89  Onset date: surgery 6/15/21                          Next 's appt.:  21    Visit# / total visits: ; STGs at visit 8     Cancels/No Shows: 0/0    Subjective:    Pain:  [x] Yes  [] No Location: R knee Pain Rating: (0-10 scale) not bad/10  Pain altered Tx:  [x] No  [] Yes  Action:    Comments: pt states she is doing well. She had to do a lot of stairs at home today. She has been increasing reps and advancing her HEP and has not had pain.       Objective:  Modalities:   Precautions: s/p R knee arthroscopy DOS  6/15/21  Exercises:  Exercise  Reps/Time Weight/Level Comments    Scifit/Nustep 7min L3          PROM   Flexion and ext         SUPINE      Patella mobs   x     Extension stretching x  Manual    Heel slides  x  Manual    Quad sets 15x5\"     SLR 20x 1# Added weight     HS stretch  3x20\"           4 way ankle             SIDE LYING      Hip abduction  20x 1# Added weight           PRONE      Hip ext 2x10 1# Added weight 7/27    HS curls  20x 1# Added weight 7/27    Quad/hip flexor stretch  3x20\"                 SEATED       LAQ 20x 1# Added weight 7/27 March  20x 1# Added weight 7/27          Extension board stretch             STANDING      Calf stretch on slant board 3x20\"     Heel raises  20x     Squats  20x     TKE 10x3\" Black     3 way hip  20xea Lime  Bilateral          Stair flexion stretch    No stairs today due to pt request    Step ups 20x 6\"    Step downs  20x 6\"    Lateral step ups  20x 6\"          Other: she is not scheduled at this time due to family issues, she will call when she knows a schedule. Treatment Charges: Mins Units   []  Modalities     [x]  Ther Exercise 30 2   []  Manual Therapy     []  Ther Activities     []  Aquatics     [x]  Vasocompression     []  Other     Total Treatment time 30 2       Assessment: [x] Progressing toward goals. Pt did well with charted exercises today. Able to add weight to mat exercises without issue. No pain noted with any of the exercises, however a pocket of swelling noted in posterior knee. Pt in a hurry to get home today, finishing exercises and declining ice. [] No change. [] Other:  [x] Patient would continue to benefit from skilled physical therapy services in order to: reduce R knee pain and effusion, restore full R knee AROM, and improve RLE strength globally to improve gait and return patient to prior level of function. Problems:    [x] ? Pain: 6-10/10 pain R medial-posterior knee   [x] ? ROM: Reduced R knee AROM globally, reduced flexibility in gastrocs, hamstring, hip flexor, quad   [x] ? Strength: reduced RLE strength globally   [x] ?  Function: reduced function indicated by LEFI of 60% impaired   [x] Other: Gait abnormalities including antalgic gait pattern, reduced stance time on RLE, unable to achieve full extension throughout gait cycle RLE        STG: (to be met in 8 treatments)  1. ? Pain: Decrease R knee pain levels to 4/10 or less to improve tolerance to therapeutic exercise progressions. 2. ? ROM: Increase AROM limitations throughout R knee to 0-125 degrees to reduce difficulty with ADLs  a. Demonstrate improved RLE flexibility with R SLR to at least 60 degrees   3. ? Strength: Increase bilateral hip strength to 5/5 globally to improve independence with heavy household chores. 4. Pt to be independent and compliant with Home Exercise Programs        LTG: (to be met in 16 treatments)  1. Improve score on assessment tool LEFI from 60% impaired to 35% impaired or less. 2. Reduce R knee pain levels to 0-1/10 to improve independence with all daily activities. 3. Pt to increase R knee and ankle strength to 5/5 to improve independence with stair climbing and transfers from floor to standing for work. 4. Pt to be able to ambulate at least 150' with improved bilateral step length, heel-toe progression, and full R knee extension through gait cycle. Patient goals: return to prior level of function     Pt. Education:  [x] Yes  [] No  [] Reviewed Prior HEP/Ed  Method of Education: [x] Verbal  [x] Demo for charted exercises   [] Written  Access Code: [email or web address]  URL: [email or web address]  Date: 07/20/2021  Prepared by: Andrew Webber    Exercises  Supine Quad Set - 2 x daily - 7 x weekly - 1 sets - 20 reps - 5sec hold  Supine Hamstring Stretch with Strap - 2 x daily - 7 x weekly - 1 sets - 3 reps - 20sec hold  Supine Active Straight Leg Raise - 2 x daily - 7 x weekly - 1 sets - 20 reps  Sidelying Hip Abduction - 2 x daily - 7 x weekly - 1 sets - 20 reps  Prone Hip Extension - 2 x daily - 7 x weekly - 1 sets - 20 reps  Prone Quadriceps Stretch with Strap - 2 x daily - 7 x weekly - 1 sets - 3 reps - 20sec hold  Prone Knee Flexion AROM - 2 x daily - 7 x weekly - 1 sets - 20 reps  Seated Long Arc Quad - 2 x daily - 7 x weekly - 1 sets - 20 reps  Seated March - 2 x daily - 7 x weekly - 1 sets - 20 reps  Mini Squat - 2 x daily - 7 x weekly - 1 sets - 20 reps  Standing Terminal Knee Extension with Resistance - 2 x daily - 7 x weekly - 1 sets - 20 reps - 5sec hold  Standing Hip Extension with Anchored Resistance - 2 x daily - 7 x weekly - 1 sets - 20 reps  Standing Hip Abduction with Anchored Resistance - 2 x daily - 7 x weekly - 1 sets - 20 reps  Standing Repeated Hip Flexion with Resistance - 2 x daily - 7 x weekly - 1 sets - 20 reps    Comprehension of Education:  [x] Verbalizes understanding. [x] Demonstrates understanding. [x] Needs review. [x] Demonstrates/verbalizes HEP/Ed previously given. Plan: [x] Continue current frequency toward long and short term goals.     [x] Specific Instructions for subsequent treatments: restore R knee active ROM, hamsting/gastroc/quad flexibility, bilateral hip and knee strength per knee arthroscopy protocol       Time In: 5:30pm            Time Out: 6:05pm    Electronically signed by:  Katiuska Madden PTA

## 2021-07-29 ENCOUNTER — APPOINTMENT (OUTPATIENT)
Dept: PHYSICAL THERAPY | Facility: CLINIC | Age: 46
End: 2021-07-29
Payer: COMMERCIAL

## 2021-08-02 ENCOUNTER — OFFICE VISIT (OUTPATIENT)
Dept: ORTHOPEDIC SURGERY | Age: 46
End: 2021-08-02

## 2021-08-02 VITALS — BODY MASS INDEX: 17.58 KG/M2 | WEIGHT: 112 LBS | HEIGHT: 67 IN

## 2021-08-02 DIAGNOSIS — Z98.890 S/P ARTHROSCOPY OF KNEE: Primary | ICD-10-CM

## 2021-08-02 PROCEDURE — 99024 POSTOP FOLLOW-UP VISIT: CPT | Performed by: ORTHOPAEDIC SURGERY

## 2021-08-02 NOTE — PROGRESS NOTES
Procedure: Right knee arthroscopic Baker's cyst debridement  Date of procedure: 6/15/2021    HPI: Ms. Mabel Schroeder is a 57-year-old who is approximately 6 weeks status post the aforementioned procedure. She indicates that she is \"doing so much better\". She did go to physical therapy but has not been able to attend recently due to having to take care of her stepson. She rates her pain as a 2/10 when present and does report having some intermittent swelling in the knee but no recurrence of her Baker's cyst.    Physical examination:  Evaluation patient's right knee and lower extremity demonstrates her portal incisions to be appropriately healed. She has full knee extension with flexion to 130 degrees which is symmetric to the contralateral side. She has no joint line tenderness and no gross instability in her knee. Sensation is grossly intact light touch in all dermatomes and she has a 2+ pedal pulses with brisk capillary refill in her toes. Impression and plan: Ms. Mabel Schroeder is a 57-year-old who is approximately 6 weeks status post a right knee arthroscopic Baker's cyst debridement. She continues to do fairly well at this time. She was encouraged to keep up with her home exercises from physical therapy and can continue to gradually increase her activity as she feels comfortable. I will see her back for reevaluation in 6 weeks but she was encouraged to return or call earlier with any questions or concerns.

## 2021-10-18 ENCOUNTER — OFFICE VISIT (OUTPATIENT)
Dept: ORTHOPEDIC SURGERY | Age: 46
End: 2021-10-18
Payer: COMMERCIAL

## 2021-10-18 VITALS — HEIGHT: 67 IN | WEIGHT: 112 LBS | BODY MASS INDEX: 17.58 KG/M2

## 2021-10-18 DIAGNOSIS — Z98.890 S/P ARTHROSCOPY OF KNEE: Primary | ICD-10-CM

## 2021-10-18 PROCEDURE — G8484 FLU IMMUNIZE NO ADMIN: HCPCS | Performed by: ORTHOPAEDIC SURGERY

## 2021-10-18 PROCEDURE — G8427 DOCREV CUR MEDS BY ELIG CLIN: HCPCS | Performed by: ORTHOPAEDIC SURGERY

## 2021-10-18 PROCEDURE — G8419 CALC BMI OUT NRM PARAM NOF/U: HCPCS | Performed by: ORTHOPAEDIC SURGERY

## 2021-10-18 PROCEDURE — 1036F TOBACCO NON-USER: CPT | Performed by: ORTHOPAEDIC SURGERY

## 2021-10-18 PROCEDURE — 99213 OFFICE O/P EST LOW 20 MIN: CPT | Performed by: ORTHOPAEDIC SURGERY

## 2021-10-18 RX ORDER — DICLOFENAC SODIUM 75 MG/1
75 TABLET, DELAYED RELEASE ORAL 2 TIMES DAILY
Qty: 28 TABLET | Refills: 0 | Status: SHIPPED | OUTPATIENT
Start: 2021-10-18 | End: 2021-12-23

## 2021-10-18 NOTE — PROGRESS NOTES
Procedure: Right knee arthroscopic Baker's cyst debridement  Date of procedure: 6/15/2021    HPI / Chief Complaint  Caryle Median is a 48 y.o. female who is approximately 4 months status post the aforementioned procedure. She indicates that overall she feels better but she still having pain in his knee especially by the end of the day. She states that it is both anterior and posterior. She states that she has to navigate a lot of stairs in her home during the day and also does a lot of kneeling with kids over the course of the day that she believes is contributing to the symptoms that she is experiencing. She denies any recurrence of her popliteal cyst but does feel sometimes that she is swollen and this responds well to over-the-counter ibuprofen which she takes sparingly. Also of note she describes having a \"Novocain\" type feeling over the anterior aspect of her knee which is been present since surgery. Past Medical History  Macarena  has a past medical history of Anxiety, GERD (gastroesophageal reflux disease), Hashimoto's disease, and Migraine. Past Surgical History  Macarena  has a past surgical history that includes Tubal ligation; partial hysterectomy (cervix not removed); Hysterectomy; Upper gastrointestinal endoscopy; Knee arthroscopy (Right, 06/15/2021); and Knee arthroscopy (Right, 6/15/2021).     Current Medications  Current Outpatient Medications   Medication Sig Dispense Refill    DEXILANT 60 MG CPDR delayed release capsule TAKE 1 CAPSULE BY MOUTH ONCE DAILY 30 capsule 0    LORazepam (ATIVAN) 0.5 MG tablet TAKE 1 TABLET BY MOUTH TWICE DAILY AS NEEDED FOR ANXIETY 30 tablet 0    tiZANidine (ZANAFLEX) 4 MG tablet TAKE ONE (1) TABLET BY MOUTH TWICE DAILY AS NEEDED FOR MUSCLE SPASMS 30 tablet 0    sertraline (ZOLOFT) 100 MG tablet TAKE 1 TABLET BY MOUTH ONCE DAILY 30 tablet 1    omeprazole (PRILOSEC) 20 MG delayed release capsule TAKE 1 CAPSULE BY MOUTH EACH MORNING BEFORE BREAKFAST 30 capsule 0    ondansetron (ZOFRAN) 4 MG tablet Take 1 tablet by mouth daily as needed for Nausea or Vomiting (Patient not taking: Reported on 6/28/2021) 20 tablet 0    levothyroxine (SYNTHROID) 100 MCG tablet Take 1 tablet by mouth Daily 90 tablet 1    SUMAtriptan (IMITREX) 100 MG tablet Take 1 tablet by mouth once as needed for Migraine 12 tablet 1    cabergoline (DOSTINEX) 0.5 MG tablet TAKE 1/2 TABLET BY MOUTH ONCE WEEKLY 3 tablet 11    diclofenac sodium (VOLTAREN) 1 % GEL Apply 4 g topically 2 times daily (Patient not taking: Reported on 6/28/2021) 150 g 1    cetirizine (ZYRTEC) 10 MG tablet Take 10 mg by mouth daily       No current facility-administered medications for this visit. Allergies  Allergies have been reviewed. Macarena is allergic to ibuprofen. Social History  Macarena  reports that she quit smoking about 22 years ago. Her smoking use included cigarettes. She has a 13.00 pack-year smoking history. She has never used smokeless tobacco. She reports that she does not drink alcohol and does not use drugs. Family History  Macarena's family history includes Cancer in her mother. Review of Systems   History obtained from the patient. REVIEW OF SYSTEMS:   Constitution: negative for fever, chills, weight loss or malaise   Musculoskeletal: As noted in the HPI   Neurologic: As noted in the HPI    Physical Exam  Ht 5' 7\" (1.702 m)   Wt 112 lb (50.8 kg)   BMI 17.54 kg/m²    General Appearance: alert, well appearing, and in no distress  Mental Status: alert, oriented to person, place, and time  Evaluation of the patient's right knee and lower extremity demonstrates intact skin without warmth, erythema, or swelling. She has full knee extension with flexion to 135 degrees. She is nontender to palpation along medial or lateral joint lines. She has no gross instability with varus and valgus stress applied across the joint at 0 and 30 degrees of knee flexion.   She has 2+ pedal pulses distally and sensation is grossly intact light touch in all dermatomes. Assessment and Plan  Imelda Deleon is a 48 y.o. old female approximately 4 months status post a right knee arthroscopic debridement for popliteal cyst that failed to respond to an extensive period of conservative management. She has not had a recurrence of the cyst and is overall doing a little bit better but is still having some anterior and posterior knee pain at this time. No structural damage was noted intraoperatively. As a result I believe this is something that should get better with time especially with appropriate rehab. She did attend physical therapy postoperatively and we discussed the possibility of getting her back in for some additional therapy however she would like to continue on with her home exercise program I believe is reasonable and she was encouraged to do so. I will see her back in my clinic as needed but she was encouraged to return or call at anytime with persistent or worsening symptoms and with any questions or concerns. Of note a prescription of Voltaren was sent to her pharmacy today. This note is created with the assistance of a speech recognition program.  While intending to generate adocument that actually reflects the content of the visit, the document can still have some errors including those of syntax and sound a like substitutions which may escape proof reading. It such instances, actual meaningcan be extrapolated by contextual diversion.

## 2021-12-23 ENCOUNTER — HOSPITAL ENCOUNTER (OUTPATIENT)
Age: 46
Setting detail: SPECIMEN
Discharge: HOME | End: 2021-12-23

## 2022-01-05 LAB — CYTOLOGY REPORT: NORMAL

## 2022-02-08 NOTE — DISCHARGE SUMMARY
[] Marcelo Trejo        Outpatient Physical                Therapy       [address]       Phone: [phone]       Fax: [phone] [x] [facility]       Promotion at [address]       Phone: [phone]       Fax: [phone] [] [facility]     [address] [address]     Phone: [phone]     Fax:  [phone]     Physical Therapy Discharge Note    Date: 2022      Patient: Deshaun Ureña  : 1975  MRN: [id number]    Physician: Valeria Appiah MD       Insurance: [employer] (30v per shannan year)  Medical Diagnosis: Z98.890- Status post arthroscopic surgery of right knee                     Rehab Codes: M25.46, M25.66, M79.605, M62.81, R26.89  Onset date: surgery 6/15/21                          Next Dr's appt. :  21     Visit# / total visits: 16; STGs at visit 8                                Cancels/No Shows: 2/0  Date of initial visit: 21               Date of final visit: 21       Discharge Status:     Pt failed to make additional appointments for therapy. Pt. Is now discharged. Electronically signed by: Lianet Lane PT    If you have any questions or concerns, please don't hesitate to call.   Thank you for your referral.

## 2022-06-06 ENCOUNTER — OFFICE VISIT (OUTPATIENT)
Dept: ORTHOPEDIC SURGERY | Age: 47
End: 2022-06-06
Payer: COMMERCIAL

## 2022-06-06 VITALS — BODY MASS INDEX: 18.52 KG/M2 | HEIGHT: 67 IN | WEIGHT: 118 LBS

## 2022-06-06 DIAGNOSIS — M25.561 RIGHT KNEE PAIN, UNSPECIFIED CHRONICITY: ICD-10-CM

## 2022-06-06 DIAGNOSIS — M62.89 TIGHTNESS OF RIGHT GASTROCNEMIUS MUSCLE: Primary | ICD-10-CM

## 2022-06-06 PROCEDURE — G8419 CALC BMI OUT NRM PARAM NOF/U: HCPCS | Performed by: ORTHOPAEDIC SURGERY

## 2022-06-06 PROCEDURE — 99212 OFFICE O/P EST SF 10 MIN: CPT | Performed by: ORTHOPAEDIC SURGERY

## 2022-06-06 PROCEDURE — 1036F TOBACCO NON-USER: CPT | Performed by: ORTHOPAEDIC SURGERY

## 2022-06-06 PROCEDURE — G8427 DOCREV CUR MEDS BY ELIG CLIN: HCPCS | Performed by: ORTHOPAEDIC SURGERY

## 2022-06-07 RX ORDER — CYCLOBENZAPRINE HCL 10 MG
10 TABLET ORAL EVERY 8 HOURS PRN
Qty: 21 TABLET | Refills: 0 | Status: SHIPPED | OUTPATIENT
Start: 2022-06-07 | End: 2022-06-23

## 2022-06-07 NOTE — PROGRESS NOTES
Procedure: Right knee arthroscopic Baker's cyst debridement  Date of procedure: 6/15/2021      HPI / Chief Complaint  Mannie Guerrero is a 48 y.o. female who presents for evaluation of her right lower extremity. She is well-known to me as she is approximately a year out from a right knee arthroscopic Baker's cyst debridement. She has been dealing with some persistent pain and discomfort in this knee and leg even after surgery. At this time she describes having pain primarily in the right mid calf region that radiates distally to the the foot and up into the hip or buttock. She describes it as a \"lightening bolt\". She denies having any associated numbness or tingling. Past Medical History  Macarena  has a past medical history of Anxiety, GERD (gastroesophageal reflux disease), Hashimoto's disease, and Migraine. Past Surgical History  Macarena  has a past surgical history that includes Tubal ligation; Partial hysterectomy; Hysterectomy; Upper gastrointestinal endoscopy; Knee arthroscopy (Right, 06/15/2021); and Knee arthroscopy (Right, 6/15/2021). Current Medications  Current Outpatient Medications   Medication Sig Dispense Refill    cyclobenzaprine (FLEXERIL) 10 mg tablet Take 1 tablet by mouth every 8 hours as needed for Muscle spasms 21 tablet 0    LORazepam (ATIVAN) 0.5 MG tablet TAKE ONE (1) TABLET BY MOUTH TWICE DAILY AS NEEDED FOR ANXIETY FOR UP TO 30 DAYS.  *DO NOT FILL UNTIL 04/30/22* 30 tablet 0    levothyroxine (SYNTHROID) 100 MCG tablet TAKE 1 TABLET BY MOUTH DAILY 30 tablet 5    cabergoline (DOSTINEX) 0.5 MG tablet TAKE 1/2 TABLET BY MOUTH ONCE WEEKLY 2 tablet 5    dexlansoprazole (DEXILANT) 60 MG CPDR delayed release capsule TAKE 1 CAPSULE BY MOUTH ONCE DAILY 30 capsule 5    fexofenadine-pseudoephedrine (ALLEGRA-D 24HR) 180-240 MG per extended release tablet Take 1 tablet by mouth daily 30 tablet 5    tiZANidine (ZANAFLEX) 4 MG tablet TAKE 1 TABLET BY MOUTH TWICE DAILY AS NEEDED FOR MUSCLE SPASMS 30 tablet 10    sertraline (ZOLOFT) 100 MG tablet TAKE 1 TABLET BY MOUTH ONCE DAILY 30 tablet 5    SUMAtriptan (IMITREX) 100 MG tablet TAKE 1 TABLET BY MOUTH ONCE AS NEEDED FOR MIGRAINE 9 tablet 1    diclofenac (VOLTAREN) 75 MG EC tablet Take 1 tablet by mouth 2 times daily for 14 days 28 tablet 0    diclofenac sodium (VOLTAREN) 1 % GEL Apply 4 g topically 2 times daily 150 g 1    cetirizine (ZYRTEC) 10 MG tablet Take 10 mg by mouth daily       No current facility-administered medications for this visit. Allergies  Allergies have been reviewed. Macarena is allergic to ibuprofen. Social History  Macarena  reports that she quit smoking about 23 years ago. Her smoking use included cigarettes. She has a 13.00 pack-year smoking history. She has never used smokeless tobacco. She reports that she does not drink alcohol and does not use drugs. Family History  Macarena's family history includes Cancer in her mother. Review of Systems   History obtained from the patient. REVIEW OF SYSTEMS:   Constitution: negative for fever, chills, weight loss or malaise   Musculoskeletal: As noted in the HPI   Neurologic: As noted in the HPI    Physical Exam  Ht 5' 7\" (1.702 m)   Wt 118 lb (53.5 kg)   BMI 18.48 kg/m²    General Appearance: alert, well appearing, and in no distress  Mental Status: alert, oriented to person, place, and time  Evaluation of the patient's right lower extremity demonstrates intact skin without warmth, erythema, or notable swelling. She has full range of motion in the knee. She is nontender to palpation diffusely about the knee but is tender to palpation over the mid calf. Notably the tenderness goes away with her knee bent but returns with her knee extended and foot dorsiflexed. Sensation is grossly intact light touch in all dermatomes and she has a 2+ radial pulse with brisk capillary refill in her toes.     Assessment and Plan  Alonso Garica is a 48 y.o. old female with right calf pain.  We had a discussion about possible etiologies. We discussed the possibility of this being due to a tight gastrocnemius. Her presentation would certainly be atypical if it were. We also discussed the possibility of sciatica but again this would be an atypical presentation. At this time I would like to get a second opinion Dr. Howard Mcfarland of foot and ankle specialist to see if in fact this could be due to tight gastrocnemius in which case she may benefit from a gastroc recession or even additional therapy. A referral was made. This note is created with the assistance of a speech recognition program.  While intending to generate adocument that actually reflects the content of the visit, the document can still have some errors including those of syntax and sound a like substitutions which may escape proof reading. It such instances, actual meaningcan be extrapolated by contextual diversion.

## 2022-06-16 DIAGNOSIS — M25.571 RIGHT ANKLE PAIN, UNSPECIFIED CHRONICITY: Primary | ICD-10-CM

## 2022-06-21 SDOH — HEALTH STABILITY: PHYSICAL HEALTH: ON AVERAGE, HOW MANY DAYS PER WEEK DO YOU ENGAGE IN MODERATE TO STRENUOUS EXERCISE (LIKE A BRISK WALK)?: 0 DAYS

## 2022-06-21 ASSESSMENT — SOCIAL DETERMINANTS OF HEALTH (SDOH)

## 2022-06-22 DIAGNOSIS — M62.89 TIGHTNESS OF RIGHT GASTROCNEMIUS MUSCLE: ICD-10-CM

## 2022-06-22 NOTE — TELEPHONE ENCOUNTER
Pharmacy requested flexeril script refill for calf tightness.  Last filled on 6/7/22.   21 tablets with 0 refills

## 2022-06-23 ENCOUNTER — OFFICE VISIT (OUTPATIENT)
Dept: ORTHOPEDIC SURGERY | Age: 47
End: 2022-06-23
Payer: COMMERCIAL

## 2022-06-23 ENCOUNTER — HOSPITAL ENCOUNTER (EMERGENCY)
Age: 47
Discharge: HOME | End: 2022-06-23
Attending: EMERGENCY MEDICINE
Payer: COMMERCIAL

## 2022-06-23 VITALS — HEIGHT: 67 IN | WEIGHT: 118 LBS | OXYGEN SATURATION: 100 % | RESPIRATION RATE: 16 BRPM | BODY MASS INDEX: 18.52 KG/M2

## 2022-06-23 VITALS
DIASTOLIC BLOOD PRESSURE: 70 MMHG | HEART RATE: 82 BPM | SYSTOLIC BLOOD PRESSURE: 115 MMHG | BODY MASS INDEX: 18.62 KG/M2 | HEIGHT: 67 IN | WEIGHT: 118.6 LBS | TEMPERATURE: 98.2 F | OXYGEN SATURATION: 97 % | RESPIRATION RATE: 16 BRPM

## 2022-06-23 DIAGNOSIS — M79.604 RIGHT LEG PAIN: Primary | ICD-10-CM

## 2022-06-23 DIAGNOSIS — M76.61 ACHILLES TENDINITIS OF RIGHT LOWER EXTREMITY: ICD-10-CM

## 2022-06-23 DIAGNOSIS — M79.661 RIGHT CALF PAIN: Primary | ICD-10-CM

## 2022-06-23 PROCEDURE — 1036F TOBACCO NON-USER: CPT | Performed by: ORTHOPAEDIC SURGERY

## 2022-06-23 PROCEDURE — 99284 EMERGENCY DEPT VISIT MOD MDM: CPT

## 2022-06-23 PROCEDURE — G8420 CALC BMI NORM PARAMETERS: HCPCS | Performed by: ORTHOPAEDIC SURGERY

## 2022-06-23 PROCEDURE — 99213 OFFICE O/P EST LOW 20 MIN: CPT | Performed by: ORTHOPAEDIC SURGERY

## 2022-06-23 PROCEDURE — G8427 DOCREV CUR MEDS BY ELIG CLIN: HCPCS | Performed by: ORTHOPAEDIC SURGERY

## 2022-06-23 PROCEDURE — 93971 EXTREMITY STUDY: CPT

## 2022-06-23 RX ORDER — HYDROCODONE BITARTRATE AND ACETAMINOPHEN 5; 325 MG/1; MG/1
1 TABLET ORAL EVERY 4 HOURS PRN
Qty: 12 TABLET | Refills: 0 | Status: SHIPPED | OUTPATIENT
Start: 2022-06-23 | End: 2022-06-30

## 2022-06-23 RX ORDER — CYCLOBENZAPRINE HCL 10 MG
TABLET ORAL
Qty: 21 TABLET | Refills: 0 | Status: SHIPPED | OUTPATIENT
Start: 2022-06-23 | End: 2022-07-14 | Stop reason: SDUPTHER

## 2022-06-23 ASSESSMENT — ENCOUNTER SYMPTOMS
COLOR CHANGE: 0
DIARRHEA: 0
CONSTIPATION: 0
VOMITING: 0
EYES NEGATIVE: 1
CHEST TIGHTNESS: 0
SINUS PAIN: 0
APNEA: 0
SHORTNESS OF BREATH: 0
ABDOMINAL DISTENTION: 0

## 2022-06-23 ASSESSMENT — PAIN DESCRIPTION - DESCRIPTORS: DESCRIPTORS: SHOOTING;SPASM

## 2022-06-23 ASSESSMENT — PAIN DESCRIPTION - FREQUENCY: FREQUENCY: CONTINUOUS

## 2022-06-23 ASSESSMENT — PAIN DESCRIPTION - PAIN TYPE: TYPE: ACUTE PAIN

## 2022-06-23 ASSESSMENT — PAIN DESCRIPTION - LOCATION: LOCATION: LEG

## 2022-06-23 ASSESSMENT — PAIN - FUNCTIONAL ASSESSMENT: PAIN_FUNCTIONAL_ASSESSMENT: 0-10

## 2022-06-23 ASSESSMENT — PAIN DESCRIPTION - ORIENTATION: ORIENTATION: RIGHT;LOWER

## 2022-06-23 NOTE — PROGRESS NOTES
[address] [place] [address] AND SPORTS MEDICINE  [facility] Devika Diaz  [address] [place] [address]  Dept: [phone]    Ambulatory Orthopedic Consult      CHIEF COMPLAINT:    Chief Complaint   Patient presents with    Ankle Pain     Right       HISTORY OF PRESENT ILLNESS:      The patient is a 48 y.o. female who is being seen for evaluation of the above, which began around September 2021 atraumatically. At today's visit, she is using no brace/assistive device. History is obtained today from:   [x]  the patient     [x]  EMR     []  one family member/friend    []  multiple family members/friends    []  other: At today's visit, she localizes her pain to the right posterior calf. The patient is referred here today by Dr. Sarabjit Garcia. REVIEW OF SYSTEMS:  Musculoskeletal: See HPI for pertinent positives     Past Medical History:    She  has a past medical history of Anxiety, GERD (gastroesophageal reflux disease), Hashimoto's disease, and Migraine. Past Surgical History:    She  has a past surgical history that includes Tubal ligation; Partial hysterectomy; Hysterectomy; Upper gastrointestinal endoscopy; Knee arthroscopy (Right, 06/15/2021); and Knee arthroscopy (Right, 6/15/2021). Current Medications:     Current Outpatient Medications:     LORazepam (ATIVAN) 0.5 MG tablet, TAKE ONE (1) TABLET BY MOUTH TWICE DAILY AS NEEDED FOR ANXIETY FOR UP TO 30 DAYS.  *DO NOT FILL UNTIL 04/30/22*, Disp: 30 tablet, Rfl: 0    levothyroxine (SYNTHROID) 100 MCG tablet, TAKE 1 TABLET BY MOUTH DAILY, Disp: 30 tablet, Rfl: 5    cabergoline (DOSTINEX) 0.5 MG tablet, TAKE 1/2 TABLET BY MOUTH ONCE WEEKLY, Disp: 2 tablet, Rfl: 5    dexlansoprazole (DEXILANT) 60 MG CPDR delayed release capsule, TAKE 1 CAPSULE BY MOUTH ONCE DAILY, Disp: 30 capsule, Rfl: 5    fexofenadine-pseudoephedrine (ALLEGRA-D 24HR) 180-240 MG per extended release tablet, Take 1 tablet by mouth daily, Disp: 30 tablet, Rfl: 5    tiZANidine (ZANAFLEX) 4 MG tablet, TAKE 1 TABLET BY MOUTH TWICE DAILY AS NEEDED FOR MUSCLE SPASMS, Disp: 30 tablet, Rfl: 10    sertraline (ZOLOFT) 100 MG tablet, TAKE 1 TABLET BY MOUTH ONCE DAILY, Disp: 30 tablet, Rfl: 5    SUMAtriptan (IMITREX) 100 MG tablet, TAKE 1 TABLET BY MOUTH ONCE AS NEEDED FOR MIGRAINE, Disp: 9 tablet, Rfl: 1    diclofenac (VOLTAREN) 75 MG EC tablet, Take 1 tablet by mouth 2 times daily for 14 days, Disp: 28 tablet, Rfl: 0    diclofenac sodium (VOLTAREN) 1 % GEL, Apply 4 g topically 2 times daily, Disp: 150 g, Rfl: 1    cetirizine (ZYRTEC) 10 MG tablet, Take 10 mg by mouth daily, Disp: , Rfl:      Allergies:    Ibuprofen    Family History:  family history includes Cancer in her mother. Social History:   Social History     Occupational History    Not on file   Tobacco Use    Smoking status: Former Smoker     Packs/day: 1.00     Years: 13.00     Pack years: 13.00     Types: Cigarettes     Quit date:      Years since quittin.4    Smokeless tobacco: Never Used   Vaping Use    Vaping Use: Never used   Substance and Sexual Activity    Alcohol use: No    Drug use: No    Sexual activity: Yes     Partners: Male     Full time caregiver at     OBJECTIVE:  Resp 16   Ht 5' 7\" (1.702 m)   Wt 118 lb (53.5 kg)   SpO2 100%   BMI 18.48 kg/m²    Psych: awake, alert  Cardio:  well perfused extremities, no cyanosis  Resp:  normal respiratory effort  Musculoskeletal:    RLE:  Vascular: Limb well perfused, compartments soft/compressible. Skin: No erythema/ulcers. Intact. Neurovascular Status:  Grossly neurovascularly intact throughout   Tenderness to Palpation:  Calf greater than proximal Achilles tendon  --Achilles:    -no nodules/thickening of Achilles tendon palpated  -no palpable gap of Achilles tendon noted  -mild pain with resisted plantarflexion        LLE:  Vascular: Limb well perfused, compartments soft/compressible.    Skin: No erythema/ulcers. Intact. Neurovascular Status:  Grossly neurovascularly intact throughout   Tenderness to Palpation:    -      RADIOLOGY:   6/23/2022 FINDINGS:  Three views (AP, Mortise, and Lateral) of the right ankle and three views (AP, Oblique, Lateral) of the right foot were obtained in the office today and reviewed, revealing no acute fracture, dislocation, or radiopaque foreign body/tumor. IMPRESSION:  No acute fracture/dislocation. Electronically signed by Leonard Lopez MD      Relevant previous imaging reviewed, both imaging and report(s) as below:    No results found. ASSESSMENT AND PLAN:  Body mass index is 18.48 kg/m². She has right calf pain. We discussed that the differential diagnosis I am considering includes the following: Tendinitis, muscle strain, muscle spasms, radiculopathy/nerve pain, space-occupying lesion, and vascular/DVT. Notably, she has the past medical history as above. She has a history of GERD and GI ulcer, history of pituitary adenoma, and thyroid disease. We had a discussion today about the likely diagnosis and its natural history, physical exam and imaging findings, as well as various treatment options in detail. Surgically, we discussed that is not recommending surgical at this time, and did recommend conservative management. Prior to this visit, she has tried activity modification, Tylenol, Voltaren gel, ice/heat, and Flexeril (she reports that Flexeril was somewhat helpful). Orders/referrals were placed as below at today's visit. Given the possibility of a DVT, I did recommend the patient immediately had to emergency department to get this ruled out. We did discuss that this is most likely not what is going on, however I do believe that it is important to rule this out as an etiology of her pain. All questions were answered and the above plan was agreed upon.  The patient will return to clinic in the very near future after she has had an appropriate DVT work-up. At the patient's next visit, depending on how the patient is doing and/or new imaging/labs results, we may consider the following options:    []  Lace up ankle     []  CAM boot         []  removable wrist brace     []  PT:        []  Wean out immobilization         []  Adv activity      []  Footmind        []  Spenco       []  Custom Orthotic:               []  AZ brace                    []  Rocker Bottom      []  Night splint    []  Heel cups        []  Strap        []  Toe gizmos    []  Topl        []  NSAIDs         []  [place]        []  Ref:         []  Stress Xray    []  CT        []  MRI  []  Inj:          []  Consider OR      []  Pick OR date    No follow-ups on file. No orders of the defined types were placed in this encounter. No orders of the defined types were placed in this encounter. Kasey Mondragon MD  Orthopedic Surgery        Please excuse any typos/errors, as this note was created with the assistance of voice recognition software. While intending to generate a document that actually reflects the content of the visit, the document can still have some errors including those of syntax and sound-a-like substitutions which may escape proof reading. In such instances, actual meaning can be extrapolated by context.

## 2022-06-23 NOTE — ED PROVIDER NOTES
EMERGENCY DEPARTMENT ENCOUNTER    Pt Name: Tomasz Cevallos  MRN: [id number]  [place] 1975  Date of evaluation: 6/23/22  CHIEF COMPLAINT       Chief Complaint   Patient presents with    Leg Pain     c/o right leg pain x6 months or more, hx knee sx last year, dr duong wants to r/o blood clot     HISTORY OF PRESENT ILLNESS   78-year-old female presents emergency room for right knee and calf pain. Patient reports pain has been present for several months. She said that she did have a Baker's cyst in the right leg which was surgically removed in the past.  She is following with Dr. Maryjo Macario and had an appointment today. He wanted her to come to the ED to rule out a blood clot. Patient reports no recent injuries. She takes Tylenol at home which helps the pain a little bit. She is unable to take anti-inflammatories due to GI upset. REVIEW OF SYSTEMS     Review of Systems   Constitutional: Negative for activity change, chills and diaphoresis. HENT: Negative for congestion, sinus pain and tinnitus. Eyes: Negative. Respiratory: Negative for apnea, chest tightness and shortness of breath. Gastrointestinal: Negative for abdominal distention, constipation, diarrhea and vomiting. Genitourinary: Negative for difficulty urinating and frequency. Musculoskeletal: Negative for arthralgias and myalgias. Skin: Negative for color change and rash. Neurological: Negative for dizziness. Hematological: Negative. Psychiatric/Behavioral: Negative.         PASTMEDICAL HISTORY     Past Medical History:   Diagnosis Date    Anxiety     GERD (gastroesophageal reflux disease)     Hashimoto's disease     Migraine      Past Problem List  Patient Active Problem List   Diagnosis Code    Hashimoto's disease E06.3    GERD (gastroesophageal reflux disease) K21.9    Anxiety F41.9    Migraine G43.909    Baker's cyst of knee, right M71.21     SURGICAL HISTORY       Past Surgical History:   Procedure Laterality Date    HYSTERECTOMY (CERVIX STATUS UNKNOWN)      KNEE ARTHROSCOPY Right 06/15/2021    RIGHT KNEE ARTHROSCOPY WITH CYST DEBRIDEMENT    KNEE ARTHROSCOPY Right 6/15/2021    RIGHT KNEE ARTHROSCOPY WITH CYST DEBRIDEMENT performed by David Shukla MD at [address] [facility] (CERVIX NOT REMOVED)      TUBAL LIGATION      UPPER GASTROINTESTINAL ENDOSCOPY       CURRENT MEDICATIONS       Previous Medications    CABERGOLINE (DOSTINEX) 0.5 MG TABLET    TAKE 1/2 TABLET BY MOUTH ONCE WEEKLY    CETIRIZINE (ZYRTEC) 10 MG TABLET    Take 10 mg by mouth daily    DEXLANSOPRAZOLE (DEXILANT) 60 MG CPDR DELAYED RELEASE CAPSULE    TAKE 1 CAPSULE BY MOUTH ONCE DAILY    DICLOFENAC (VOLTAREN) 75 MG EC TABLET    Take 1 tablet by mouth 2 times daily for 14 days    DICLOFENAC SODIUM (VOLTAREN) 1 % GEL    Apply 4 g topically 2 times daily    FEXOFENADINE-PSEUDOEPHEDRINE (ALLEGRA-D 24HR) 180-240 MG PER EXTENDED RELEASE TABLET    Take 1 tablet by mouth daily    LEVOTHYROXINE (SYNTHROID) 100 MCG TABLET    TAKE 1 TABLET BY MOUTH DAILY    LORAZEPAM (ATIVAN) 0.5 MG TABLET    TAKE ONE (1) TABLET BY MOUTH TWICE DAILY AS NEEDED FOR ANXIETY FOR UP TO 30 DAYS. *DO NOT FILL UNTIL 22*    SERTRALINE (ZOLOFT) 100 MG TABLET    TAKE 1 TABLET BY MOUTH ONCE DAILY    SUMATRIPTAN (IMITREX) 100 MG TABLET    TAKE 1 TABLET BY MOUTH ONCE AS NEEDED FOR MIGRAINE    TIZANIDINE (ZANAFLEX) 4 MG TABLET    TAKE 1 TABLET BY MOUTH TWICE DAILY AS NEEDED FOR MUSCLE SPASMS     ALLERGIES     is allergic to ibuprofen. FAMILY HISTORY     She indicated that her mother is alive. She indicated that her father is alive. She indicated that both of her sisters are alive.      SOCIAL HISTORY       Social History     Tobacco Use    Smoking status: Former Smoker     Packs/day: 1.00     Years: 13.00     Pack years: 13.00     Types: Cigarettes     Quit date:      Years since quittin.4    Smokeless tobacco: Never Used   Vaping Use    Vaping Use: Never used   Substance Use Topics    Alcohol use: No    Drug use: No     PHYSICAL EXAM     INITIAL VITALS: /70   Pulse 82   Temp 98.2 °F (36.8 °C) (Oral)   Resp 16   Ht 5' 7\" (1.702 m)   Wt 118 lb 9.6 oz (53.8 kg)   SpO2 97%   BMI 18.58 kg/m²    Physical Exam  Constitutional:       General: She is not in acute distress. Appearance: She is well-developed. HENT:      Head: Normocephalic. Eyes:      Pupils: Pupils are equal, round, and reactive to light. Cardiovascular:      Rate and Rhythm: Normal rate and regular rhythm. Heart sounds: Normal heart sounds. Pulmonary:      Effort: Pulmonary effort is normal. No respiratory distress. Breath sounds: Normal breath sounds. Musculoskeletal:         General: Normal range of motion. Right lower leg: No edema. Left lower leg: No edema. Skin:     General: Skin is warm and dry. Comments: No erythema to the right leg, no swelling   Neurological:      Mental Status: She is alert and oriented to person, place, and time. MEDICAL DECISION MAKING:     Well-appearing 43-year-old female presenting to the emergency room for issues with pain behind the right knee and calf for multiple months. She does not have any visible abnormalities; no erythema no tenderness no visible injuries. She does have tenderness to the area. Ultrasound is negative for DVT. I have low suspicion for malignant or emergent process. I updated patient results here in the ED. disposition and plan were reviewed with the patient. Patient is aware that ED testing may be falsely reassuring and patient understands if symptoms worsen reassessment may be necessary.        CRITICAL CARE:       PROCEDURES:    Procedures    DIAGNOSTIC RESULTS   EKG:All EKG's are interpreted by the Emergency Department Physician who either signs or Co-signs this chart in the absence of a cardiologist.        RADIOLOGY:All plain film, CT, MRI, and formal ultrasound images (except ED bedside ultrasound) are read by the radiologist, see reports below, unless otherwisenoted in MDM or here. VL Lower Extremity Venous Right    (Results Pending)     LABS: All lab results were reviewed by myself, and all abnormals are listed below. Labs Reviewed - No data to display    EMERGENCY DEPARTMENTCOURSE:         Vitals:    Vitals:    06/23/22 1511   BP: 115/70   Pulse: 82   Resp: 16   Temp: 98.2 °F (36.8 °C)   TempSrc: Oral   SpO2: 97%   Weight: 118 lb 9.6 oz (53.8 kg)   Height: 5' 7\" (1.702 m)       The patient was given the following medications while in the emergency department:  Orders Placed This Encounter   Medications    HYDROcodone-acetaminophen (NORCO) 5-325 MG per tablet     Sig: Take 1 tablet by mouth every 4 hours as needed for Pain for up to 7 days. Intended supply: 3 days. Take lowest dose possible to manage pain     Dispense:  12 tablet     Refill:  0     CONSULTS:  None    FINAL IMPRESSION      1. Right leg pain          DISPOSITION/PLAN   DISPOSITION Decision To Discharge 06/23/2022 05:25:29 PM      PATIENT REFERRED TO:  Birgit Diaz, [address] [place] [address]  [address] [place] [id number] [phone]    Schedule an appointment as soon as possible for a visit in 1 week      DISCHARGE MEDICATIONS:  New Prescriptions    HYDROCODONE-ACETAMINOPHEN (NORCO) 5-325 MG PER TABLET    Take 1 tablet by mouth every 4 hours as needed for Pain for up to 7 days. Intended supply: 3 days. Take lowest dose possible to manage pain     Luis Aguillon MD  Attending Emergency Physician      Care during this encounter was due to an unprecedented national emergency due to COVID-19.             Wm Huang MD  06/23/22 2867

## 2022-06-23 NOTE — ED NOTES
Pt up to use the bathroom at this time, gait is steady and even      Naina Huynh, GINA  06/23/22 0247

## 2022-06-23 NOTE — ED NOTES
Pt here for c/o right leg pain   Pt was told to come in for a DVT work up   Pt denies any SOB   Pt rates pain to the right leg a 6/10     Robin Mendoza RN  06/23/22 1640

## 2022-06-25 ENCOUNTER — TELEPHONE (OUTPATIENT)
Dept: GASTROENTEROLOGY | Age: 47
End: 2022-06-25

## 2022-06-25 RX ORDER — POLYETHYLENE GLYCOL 3350 17 G/17G
POWDER, FOR SOLUTION ORAL
Qty: 238 G | Refills: 0 | Status: SHIPPED | OUTPATIENT
Start: 2022-06-25

## 2022-06-25 RX ORDER — BISACODYL 5 MG
TABLET, DELAYED RELEASE (ENTERIC COATED) ORAL
Qty: 4 TABLET | Refills: 0 | Status: SHIPPED | OUTPATIENT
Start: 2022-06-25 | End: 2022-07-21

## 2022-06-25 NOTE — TELEPHONE ENCOUNTER
Screening colonoscopy/Angy [place] 8/22/22 @ 8:00am     Verbal instructions given via phone  Written mailed    Miralax/dulcolax    Screening Questionnaire

## 2022-06-28 ENCOUNTER — TELEPHONE (OUTPATIENT)
Dept: ORTHOPEDIC SURGERY | Age: 47
End: 2022-06-28

## 2022-06-28 NOTE — TELEPHONE ENCOUNTER
Patient was seen in office 06/23 and advised to [facility] ED for ultrasound and MRI    She did go to [facility] and they got ultrasound done, however they did not have any technicians to do MRI. She is asking if you can give her an order for MRI so she doesn't have to got back to ED?     Please call her with direction  Thank you

## 2022-06-30 ENCOUNTER — OFFICE VISIT (OUTPATIENT)
Dept: ORTHOPEDIC SURGERY | Age: 47
End: 2022-06-30

## 2022-06-30 VITALS
HEIGHT: 67 IN | HEART RATE: 76 BPM | OXYGEN SATURATION: 100 % | RESPIRATION RATE: 14 BRPM | WEIGHT: 118 LBS | BODY MASS INDEX: 18.52 KG/M2

## 2022-06-30 DIAGNOSIS — M76.61 ACHILLES TENDINITIS OF RIGHT LOWER EXTREMITY: Primary | ICD-10-CM

## 2022-06-30 DIAGNOSIS — M76.61 ACHILLES TENDINITIS OF RIGHT LOWER EXTREMITY: ICD-10-CM

## 2022-06-30 DIAGNOSIS — M79.661 RIGHT CALF PAIN: Primary | ICD-10-CM

## 2022-06-30 PROCEDURE — G8427 DOCREV CUR MEDS BY ELIG CLIN: HCPCS | Performed by: ORTHOPAEDIC SURGERY

## 2022-06-30 PROCEDURE — G8419 CALC BMI OUT NRM PARAM NOF/U: HCPCS | Performed by: ORTHOPAEDIC SURGERY

## 2022-06-30 PROCEDURE — 1036F TOBACCO NON-USER: CPT | Performed by: ORTHOPAEDIC SURGERY

## 2022-06-30 PROCEDURE — 99214 OFFICE O/P EST MOD 30 MIN: CPT | Performed by: ORTHOPAEDIC SURGERY

## 2022-06-30 NOTE — PROGRESS NOTES
[address] [place] [address] AND SPORTS MEDICINE  [place] [address]  Dept: [phone]    Ambulatory Orthopedic Consult      CHIEF COMPLAINT:    Chief Complaint   Patient presents with    Leg Pain     Right       HISTORY OF PRESENT ILLNESS:      The patient is a 48 y.o. female who is being seen for evaluation of the above, which began around September 2021 atraumatically. At today's visit, she is using no brace/assistive device. History is obtained today from:   [x]  the patient     [x]  EMR     []  one family member/friend    []  multiple family members/friends    []  other: At today's visit, she localizes her pain to the right posterior calf. The patient is referred here today by Dr. Andrea Alba. INTERVAL HISTORY 6/30/2022:  She is seen again today in the office for follow up of a previous issue (as above). Since being seen last, the patient is doing about the same overall. At today's visit, she is not using a brace or assistive device. History is obtained today from:   [x]  the patient     []  EMR     []  one family member/friend    []  multiple family members/friends    []  other:          REVIEW OF SYSTEMS:  Musculoskeletal: See HPI for pertinent positives     Past Medical History:    She  has a past medical history of Anxiety, GERD (gastroesophageal reflux disease), Hashimoto's disease, and Migraine. Past Surgical History:    She  has a past surgical history that includes Tubal ligation; Partial hysterectomy; Hysterectomy; Upper gastrointestinal endoscopy; Knee arthroscopy (Right, 06/15/2021); and Knee arthroscopy (Right, 6/15/2021).      Current Medications:     Current Outpatient Medications:     LORazepam (ATIVAN) 0.5 MG tablet, TAKE ONE (1) TABLET BY MOUTH TWICE DAILY AS NEEDED FOR ANXIETY FOR UP TO 30 DAYS, Disp: 30 tablet, Rfl: 0    diclofenac sodium (VOLTAREN) 1 % GEL, Apply 4 g topically 4 times daily as needed for Pain, Disp: 200 g, Rfl: 0    polyethylene glycol (GLYCOLAX) 17 GM/SCOOP powder, Take as directed for bowel prep, Disp: 238 g, Rfl: 0    bisacodyl (BISACODYL) 5 MG EC tablet, Take as directed for bowel prep, Disp: 4 tablet, Rfl: 0    cyclobenzaprine (FLEXERIL) 10 MG tablet, TAKE ONE TABLET BY MOUTH EVERY 8 HOURS AS NEEDED FOR MUSCLE SPASMS, Disp: 21 tablet, Rfl: 0    levothyroxine (SYNTHROID) 100 MCG tablet, TAKE 1 TABLET BY MOUTH DAILY, Disp: 30 tablet, Rfl: 5    cabergoline (DOSTINEX) 0.5 MG tablet, TAKE 1/2 TABLET BY MOUTH ONCE WEEKLY, Disp: 2 tablet, Rfl: 5    dexlansoprazole (DEXILANT) 60 MG CPDR delayed release capsule, TAKE 1 CAPSULE BY MOUTH ONCE DAILY, Disp: 30 capsule, Rfl: 5    fexofenadine-pseudoephedrine (ALLEGRA-D 24HR) 180-240 MG per extended release tablet, Take 1 tablet by mouth daily, Disp: 30 tablet, Rfl: 5    tiZANidine (ZANAFLEX) 4 MG tablet, TAKE 1 TABLET BY MOUTH TWICE DAILY AS NEEDED FOR MUSCLE SPASMS, Disp: 30 tablet, Rfl: 10    sertraline (ZOLOFT) 100 MG tablet, TAKE 1 TABLET BY MOUTH ONCE DAILY, Disp: 30 tablet, Rfl: 5    SUMAtriptan (IMITREX) 100 MG tablet, TAKE 1 TABLET BY MOUTH ONCE AS NEEDED FOR MIGRAINE, Disp: 9 tablet, Rfl: 1    diclofenac (VOLTAREN) 75 MG EC tablet, Take 1 tablet by mouth 2 times daily for 14 days, Disp: 28 tablet, Rfl: 0    diclofenac sodium (VOLTAREN) 1 % GEL, Apply 4 g topically 2 times daily, Disp: 150 g, Rfl: 1    cetirizine (ZYRTEC) 10 MG tablet, Take 10 mg by mouth daily, Disp: , Rfl:      Allergies:    Ibuprofen    Family History:  family history includes Cancer in her mother.     Social History:   Social History     Occupational History    Not on file   Tobacco Use    Smoking status: Former Smoker     Packs/day: 1.00     Years: 13.00     Pack years: 13.00     Types: Cigarettes     Quit date:      Years since quittin.5    Smokeless tobacco: Never Used   Vaping Use    Vaping Use: Never used   Substance and Sexual Activity    Alcohol use: No    Drug use: No    Sexual activity: Yes     Partners: Male     Full time caregiver at     OBJECTIVE:  Pulse 76   Resp 14   Ht 5' 7\" (1.702 m)   Wt 118 lb (53.5 kg)   SpO2 100%   BMI 18.48 kg/m²    Psych: awake, alert  Cardio:  well perfused extremities, no cyanosis  Resp:  normal respiratory effort  Musculoskeletal:    RLE:  Vascular: Limb well perfused, compartments soft/compressible. Skin: No erythema/ulcers. Intact. Neurovascular Status:  Grossly neurovascularly intact throughout   Tenderness to Palpation:  Calf greater than proximal Achilles tendon  --Achilles:    -no nodules/thickening of Achilles tendon palpated  -no palpable gap of Achilles tendon noted  -mild pain with resisted plantarflexion        LLE:  Vascular: Limb well perfused, compartments soft/compressible. Skin: No erythema/ulcers. Intact. Neurovascular Status:  Grossly neurovascularly intact throughout   Tenderness to Palpation:    -      RADIOLOGY:   6/30/2022 No new radiology images today. Prior images reviewed for reference. FINDINGS:  Three views (AP, Mortise, and Lateral) of the right ankle and three views (AP, Oblique, Lateral) of the right foot were obtained in the office today and reviewed, revealing no acute fracture, dislocation, or radiopaque foreign body/tumor. IMPRESSION:  No acute fracture/dislocation. Electronically signed by Gorge Monreal MD      Relevant previous report reviewed as below:    -6/23/2022 right lower extremity ultrasound:  No evidence of superficial or deep venous thrombosis in the right lower    extremity. ASSESSMENT AND PLAN:  Body mass index is 18.48 kg/m². She has right calf pain. We again discussed that the differential diagnosis I am considering includes the following: Tendinitis/muscle strain (does report the pain is worse with activity), muscle spasms, radiculopathy/nerve pain, space-occupying lesion, and vascular/DVT.   An ultrasound on 6/23/2022 showed no evidence of DVT. Notably, she has the past medical history as above. She has a history of GERD and GI ulcer, history of pituitary adenoma, and thyroid disease. We had a discussion today about the likely diagnosis and its natural history, physical exam and imaging findings, as well as various treatment options in detail. Surgically, we discussed that no surgical intervention is indicated at this time, and I have recommended conservative management. Prior to this visit, she has tried activity modification, Tylenol, Voltaren gel, ice/heat, and Flexeril (she reports that Flexeril was somewhat helpful). Orders/referrals were placed as below at today's visit. The patient was provided heel lifts for her shoe. She was ordered compression socks. The patient was referred to physical therapy for my Achilles tendinopathy protocol, as well as calf stretching and general strengthening. I provided a prescription for Voltaren (4g TOPL q QID PRN pain). I recommend this over the use of oral NSAIDs because of the patient's history of GERD/GI ulcer. All questions were answered and the above plan was agreed upon. The patient will return to clinic in 8 weeks as needed without x-rays. At her next visit, depending on how she is doing, we may consider an MRI.            At the patient's next visit, depending on how the patient is doing and/or new imaging/labs results, we may consider the following options:    []  Lace up ankle     []  CAM boot         []  removable wrist brace     []  PT:        []  Wean out immobilization         []  Adv activity      []  Footmind        []  Spenco       []  Custom Orthotic:               []  AZ brace                    []  Rocker Bottom      []  Night splint    []  Heel cups        []  Strap        []  Toe gizmos    []  Topl        []  NSAIDs         []  Alton        []  Ref:         []  Stress Xray    []  CT        []  MRI  []  Inj:          [] Consider OR      []  Pick OR date    No follow-ups on file. No orders of the defined types were placed in this encounter. Orders Placed This Encounter   Procedures    Ambulatory referral to Physical Therapy     Referral Priority:   Routine     Referral Type:   Eval and Treat     Referral Reason:   Specialty Services Required     Requested Specialty:   Physical Therapist     Number of Visits Requested:   1    Ambulatory referral to Physical Therapy     Referral Priority:   Routine     Referral Type:   Eval and Treat     Referral Reason:   Specialty Services Required     Requested Specialty:   Physical Therapist     Number of Visits Requested:   1    DME Order for (Specify) as OP     DME: compression stockings (20-30 mm Hg)  Routine, External, Referral By - Armando Manriquez, Qty-1, Supply Name: Compression Stockings Prognosis: good Estimated length of need: 99         Romana Sicard, MD  Orthopedic Surgery        Please excuse any typos/errors, as this note was created with the assistance of voice recognition software. While intending to generate a document that actually reflects the content of the visit, the document can still have some errors including those of syntax and sound-a-like substitutions which may escape proof reading. In such instances, actual meaning can be extrapolated by context.

## 2022-07-07 NOTE — TELEPHONE ENCOUNTER
Patient called and rescheduled colonoscopy:    PEDRO LUIS 8/1/22 @ 9:30am    Verbal instructions given

## 2022-07-11 ENCOUNTER — HOSPITAL ENCOUNTER (OUTPATIENT)
Dept: PHYSICAL THERAPY | Facility: CLINIC | Age: 47
Setting detail: THERAPIES SERIES
Discharge: HOME | End: 2022-07-11
Payer: COMMERCIAL

## 2022-07-11 PROCEDURE — 97110 THERAPEUTIC EXERCISES: CPT

## 2022-07-11 PROCEDURE — 97140 MANUAL THERAPY 1/> REGIONS: CPT

## 2022-07-11 PROCEDURE — 97161 PT EVAL LOW COMPLEX 20 MIN: CPT

## 2022-07-11 NOTE — CONSULTS
[x] [facility]  Therapy  [facility]   [place]: [phone]  F: [phone]      Physical Therapy Lower Extremity Evaluation    Date:  2022  Patient: Vicky Campbell  : 1975  MRN: [id number]  Physician: Dr Fabian Dandy: [place] [employer] 30 v, no copay  Medical Diagnosis: RLE calf, achilles pain  Rehab Codes: M79.661, M76.61  Onset date: Dec 2021    Next 's appt.: 22    Subjective:   CC/HPI:  Pt is a 47 yo female with RLE calf/achilles tightness and pain. Pain has been present since 2021, she had surgery for a Baker's cyst in 2021. Pain got worse a few months after surgery. Saw Dr Lonnie Pinzon, [address] [facility]) for DVT. Reports from MD:     The patient was provided heel lifts for her shoe. She was ordered compression socks. The patient was referred to physical therapy for my Achilles tendinopathy protocol, as well as calf stretching and general strengthening. She feels the heel lifts have helped, has been wearing them. She is getting fit for compression socks tomorrow. PMHx:   Past Medical History in prose (no negatives)    has a past medical history of Anxiety, GERD (gastroesophageal reflux disease), Hashimoto's disease, and Migraine.         Past Surgical History:    She  has a past surgical history that includes Tubal ligation; Partial hysterectomy; Hysterectomy; Upper gastrointestinal endoscopy; Knee arthroscopy (Right, 06/15/2021); and Knee arthroscopy (Right, 6/15/2021).                  [x] Refer to full medical chart  In EPIC     Tests: Relevant previous report reviewed as below:    -2022 right lower extremity ultrasound:  No evidence of superficial or deep venous thrombosis in the right lower    extremity.      Medications:  [] Refer to full medical record [] None [] Other:  Allergies:       [] Refer to full medical record [] None [] Other:    ADL/IADL [x] Previously independent with all [x] Currently independent with all Who currently assists the patient with task     [] Previously independent with all except: [] Currently independent with all except:     Bathing  [] Assist [] Assist     Dress/grooming [] Assist [] Assist     Transfer/mobility [] Assist [] Assist     Feeding [] Assist [] Assist     Toileting [] Assist [] Assist     Driving [] Assist [] Assist     Housekeeping [] Assist [] Assist     Grocery shop/meal prep [] Assist [] Assist          Gait Prior level of function Current level of function    [x] Independent  [] Assist [x] Independent  [] Assist   Device: [] Independent [] Independent    [] Straight Cane [] Quad cane [] Straight Cane [] Quad cane    [] Standard walker [] Rolling walker   [] 4 wheeled walker [] Standard walker [] Rolling walker   [] 4 wheeled walker    [] Wheelchair [] Wheelchair       Marital Status boyfriend   Home type 3 story home   Employment Day care    Job status FT   Work Activities/duties  Standing, walking, on the floor, up and down stairs   Recreational Activities Walking        Pain present?  yes   Location RLE calf middle of muscle   Pain Rating currently 6/10   Pain at worse 10/10   Pain at best 4/10   Description of pain Sharp, dull ache   Altered Sensation Intact    What makes it worse Standing, walking, positions, activity   What makes it better Heat, massage, muscle relaxer, Voltaren gel             Objective:    STRENGTH    Left Right   Ankle DF 5 4   PF 5 4+   INV 4+ 4   EVER 4+ 4            ROM  ° A/P    Left Right   Ankle DF  -20   PF  30   INV  20   EVER  10          Edema Left  Right    Ankle:     Figure 8 47 cm 47.5 cm            OBSERVATION No Deficit Deficit Not Tested Comments   Palpation [] [x] [] Pain in RLE gastroc middle of muscle   Sensation [x] [] []    Gait [] [x] [] Analysis: decreased heel contact on RLE, decreased stride length         FUNCTION Normal Difficult Unable   Sitting [x] [] []   Standing [] [x] []   Ambulation [] [x] [] Stairs [] [x] []   Squat [] [x] []   Heel raises  [] [x] []         BALANCE/PROPRIOCEPTION              [] Not tested   Single leg stance       R                     L                                PAIN   Eyes open                      8       Sec. 10           Sec                  . []    Eyes closed                     3     Sec. 3       Sec                  . []           Flexibility Normal Left tight Right tight   HS [] [x] [x]   gastroc [] [x] [x]    [] [] []    [] [] []    [] [] []          Functional Test: LEFS Score: 56% functionally impaired       Assessment:    Patient would benefit from skilled physical therapy services in order to improve flexibility, strength and mobility to allow patient to have less pain with work and ADLs. Problems:    [x] ? Pain  [x] ? ROM  [x] ? Strength  [x] ? Function        Goals  MET NOT MET ON-  GOING  Details   Date Addressed:        STG: To be met in 10 treatments           1. ? Pain: Decrease pain levels to 2/10 with ADL/sport  []  []  []      2. ? ROM: Increase flexibility and AROM limitations throughout to equal bilat to reduce difficulty with ADLs []  []  []      3. ? Strength: Increase MMT to 5/5 throughout to ease functional limitations and mobility  []  []  []     4. Independent with Home Exercise Programs []  []  []      []  []  []     Date Addressed:        LTG: To be met in 20 treatments       1. Improve score on assessment tool LEFS from 56% impairment to less than 25% impairment  []  []  []     2.  Reduce pain levels to 2/10 or less with ADLs []  []  []      []  []  []                Patient goals: decrease pain     Rehab Potential:  [x] Good  [] Fair  [] Poor   Suggested Professional Referral:  [x] No  [] Yes:  Barriers to Goal Achievement[de-identified]  [x] No  [] Yes:  Domestic Concerns:  [x] No  [] Yes:    Pt. Education:  [x] Plans/Goals, Risks/Benefits discussed  [x] Home exercise program    Method of Education: [x] Verbal  [x] Demo  [x] Written  Comprehension of Education:  [x] Verbalizes understanding. [x] Demonstrates understanding. [x] Needs Review. [] Demonstrates/verbalizes understanding of HEP/Ed previously given. Access Code: [email or web address]  URL: [email or web address]  Date: 07/11/2022  Prepared by: Rebeca Connor    Exercises  Seated Heel Toe Raises - 1 x daily - 7 x weekly - 10 reps - 3 sets  Seated Heel Raise - 1 x daily - 7 x weekly - 10 reps - 3 sets  Seated Ankle Alphabet - 1 x daily - 7 x weekly - 10 reps - 3 sets  Seated Great Toe Extension - 1 x daily - 7 x weekly - 10 reps - 3 sets  Supine Ankle Circles - 1 x daily - 7 x weekly - 1 reps - 3 sets  Seated Lesser Toes Extension - 1 x daily - 7 x weekly - 10 reps - 3 sets  Supine Ankle Pumps - 1 x daily - 7 x weekly - 10 reps - 3 sets  Towel Scrunches - 1 x daily - 7 x weekly - 3 sets - 10 reps      Treatment Plan:  [x] Therapeutic Exercise    [] Aquatic Therapy   [x] Manual Therapy     [] Electrical Stimulation  [x] Instruction in HEP      [] Lumbar/Cervical Traction  [] Neuromuscular Re-education [] Cold/hotpack  [] Iontophoresis: 4 mg/mL  [x] Vasocompression (GameReady)                    Dexamethasone Sodium  [x] Gait Training             Phosphate 40-80 mAmin         []  Medication allergies reviewed for use of    Dexamethasone Sodium Phosphate 4mg/ml     with iontophoresis treatments. Pt is not allergic.     Frequency:  2 x/week for 20 visits    Todays Treatment:    Exercises:  Exercise    RLE calf pain, tightness  Reps/ Time Weight/ Level Comments         Bike            Ankle pumps      Ankle circles      Towel curls      Toe yoga      Heel toe raises            Total Gym Heel       Total Gym squats       SLS      Rebounder      Calf stretch      HS stretch      lunges                              Other: Manual hypervolt and instruction on self-massage to RLE gastroc         Evaluation Complexity:  History (Personal factors, comorbidities) [x] 0 [] 1-2 [] 3+   Exam (limitations, restrictions) [x] 1-2 [] 3 [] 4+   Clinical presentation (progression) [x] Stable [] Evolving  [] Unstable   Decision Making [x] Low [] Moderate [] High    [x] Low Complexity [] Moderate Complexity [] High Complexity       Treatment Charges: Mins Units   [x] Evaluation       [x]  Low       []  Moderate       []  High 25 1   []  Modalities     [x]  Ther Exercise 10 1   [x]  Manual Therapy 10 1   []  Ther Activities     []  Aquatics     []  Vasocompression     []  Other       TOTAL TREATMENT TIME: 45    Time in:1800   Time Out:1850    Electronically signed by: Marco A Pastrana PT        Physician Signature:________________________________Date:__________________  By signing above or cosigning this note, I have reviewed this plan of care and certify a need for medically necessary rehabilitation services.      *PLEASE SIGN ABOVE AND FAX BACK ALL PAGES*

## 2022-07-18 ENCOUNTER — HOSPITAL ENCOUNTER (OUTPATIENT)
Dept: PHYSICAL THERAPY | Facility: CLINIC | Age: 47
Setting detail: THERAPIES SERIES
Discharge: HOME | End: 2022-07-18
Payer: COMMERCIAL

## 2022-07-18 NOTE — FLOWSHEET NOTE
[x] [facility]  [facility]  [facility]   [place]: [phone]  F: [phone]      Physical Therapy Cancel/No Show note    Date: 2022  Patient: Tavo Gama  : 1975  MRN: [id number]    Cancels/No Shows to date: 1    For today's appointment patient:    [x]  Cancelled    [] Rescheduled appointment    [] No-show     Reason given by patient:    [x]  Patient ill    []  Conflicting appointment    [] No transportation      [] Conflict with work    [] No reason given    [] Weather related    [] COVID-19    [] Other:      Comments:        [x] Next appointment was confirmed    Electronically signed by: Miguel A Medina PTA

## 2022-07-21 ENCOUNTER — HOSPITAL ENCOUNTER (OUTPATIENT)
Dept: PHYSICAL THERAPY | Facility: CLINIC | Age: 47
Setting detail: THERAPIES SERIES
Discharge: HOME | End: 2022-07-21
Payer: COMMERCIAL

## 2022-07-21 DIAGNOSIS — M76.61 ACHILLES TENDINITIS OF RIGHT LOWER EXTREMITY: ICD-10-CM

## 2022-07-21 RX ORDER — BISACODYL 5 MG
TABLET, DELAYED RELEASE (ENTERIC COATED) ORAL
Qty: 4 TABLET | Refills: 10 | Status: SHIPPED | OUTPATIENT
Start: 2022-07-21

## 2022-07-21 NOTE — FLOWSHEET NOTE
[x] [facility]  [facility]  [facility]   [place]: [phone]  F: [phone]      Physical Therapy Cancel/No Show note    Date: 2022  Patient: Imelda Deleon  : 1975  MRN: [id number]    Cancels/No Shows to date: 2    For today's appointment patient:    [x]  Cancelled    [] Rescheduled appointment    [] No-show     Reason given by patient:    [x]  Patient ill    []  Conflicting appointment    [] No transportation      [] Conflict with work    [] No reason given    [] Weather related    [] COVID-19    [] Other:      Comments:        [x] Next appointment was confirmed    Electronically signed by: Blossom Gallagher PTA

## 2022-07-22 DIAGNOSIS — M76.61 ACHILLES TENDINITIS OF RIGHT LOWER EXTREMITY: Primary | ICD-10-CM

## 2022-07-22 RX ORDER — BISACODYL 5 MG
TABLET, DELAYED RELEASE (ENTERIC COATED) ORAL
Qty: 4 TABLET | Refills: 10 | OUTPATIENT
Start: 2022-07-22

## 2022-07-25 ENCOUNTER — HOSPITAL ENCOUNTER (OUTPATIENT)
Dept: PHYSICAL THERAPY | Facility: CLINIC | Age: 47
Setting detail: THERAPIES SERIES
Discharge: HOME | End: 2022-07-25
Payer: COMMERCIAL

## 2022-07-25 PROCEDURE — 97110 THERAPEUTIC EXERCISES: CPT

## 2022-07-25 NOTE — FLOWSHEET NOTE
[x] [facility]  Outpatient Rehabilitation &  Therapy  [facility]   [place]: [phone]  F: [phone]      Physical Therapy Daily Treatment Note    Date:  2022  Patient Name:  Karen Mcneal    :  1975  MRN: [id number]  Physician: Dr Crisostomo Roots: [place] Medicaid 30 v, no copay  Medical Diagnosis: RLE calf, achilles pain              Rehab Codes: P31.283, M76.61  Onset date: Dec 2021                                               Next 's appt.: 22    Visit# / total visits:      Cancels/No Shows:     Subjective:    Pain:  [] Yes  [x] No Location: RLE  Pain Rating: (0-10 scale) 0/10  Pain altered Tx:  [x] No  [] Yes  Action:  Comments: Patient arrived noting compliant with HEP and notes significant decrease in symptoms with addition of exercises and heel wedges. Notes has purchased a massage gun for home use. Objective:  Exercises:  Exercise     RLE calf pain, tightness  Reps/ Time Weight/ Level Comments             Bike  5'                 Toe yoga  x20             Total Gym Heel  2x10 L20  Low level   Total Gym squats  2x10 L20     Rebounder  x20       Calf stretch  3x30\"       HS stretch  3x30\"       Lunges  2x10       Heel taps  2x10  4\"     Balance board  5'  L2     4 way hip  x15  Green               Other: Manual hypervolt to RLE gastroc       Treatment Charges: Mins Units   []  Modalities     []  Ther Exercise 35 2   []  Manual Therapy     []  Ther Activities     []  Aquatics     []  Vasocompression     []  Other     Total Treatment time 35 2       Assessment: [x] Progressing toward goals. Progressed strength and stability program this date. Patient notes increased muscle soreness with no complaint of pain post treatment. Pain noted with heel raises, lowered stand to lowest level with improved tolerance. Decreased tension noted with manual this date. Will continue to progress stability program per patients tolerance.      [] No change. [] Other:  [x] Patient would continue to benefit from skilled physical therapy services in order to: flexibility, strength and mobility to allow patient to have less pain with work and ADLs. Goals MET NOT MET ON-  GOING Details   Date Addressed:           STG: To be met in 10 treatments            1. ? Pain: Decrease pain levels to 2/10 with ADL/sport []  []  []      2. ? ROM: Increase flexibility and AROM limitations throughout to equal bilat to reduce difficulty with ADLs []  []  []      3. ? Strength: Increase MMT to 5/5 throughout to ease functional limitations and mobility []  []  []      4. Independent with Home Exercise Programs []  []  []        []  []  []      Date Addressed:           LTG: To be met in 20 treatments           1. Improve score on assessment tool LEFS from 56% impairment to less than 25% impairment  []  []  []      2. Reduce pain levels to 2/10 or less with ADLs []  []  []        []  []  []                        Patient goals: decrease pain      Pt. Education:  [x] Yes  [] No  [x] Reviewed Prior HEP/Ed  Method of Education: [x] Verbal  [] Demo  [] Written  Comprehension of Education:  [x] Verbalizes understanding. [] Demonstrates understanding. [] Needs review. [x] Demonstrates/verbalizes HEP/Ed previously given. Plan: [x] Continue current frequency toward long and short term goals. [x] Specific Instructions for subsequent treatments: continue strength progressions per tolerance.        Time In: 1805             Time Out: [address]    Electronically signed by:  Klaudia Keita PTA

## 2022-07-28 ENCOUNTER — HOSPITAL ENCOUNTER (OUTPATIENT)
Dept: PHYSICAL THERAPY | Facility: CLINIC | Age: 47
Setting detail: THERAPIES SERIES
Discharge: HOME | End: 2022-07-28
Payer: COMMERCIAL

## 2022-07-28 PROCEDURE — 97110 THERAPEUTIC EXERCISES: CPT

## 2022-07-28 NOTE — FLOWSHEET NOTE
[x] [facility]  Outpatient Rehabilitation &  Therapy  [facility]   [place]: [phone]  F: [phone]      Physical Therapy Daily Treatment Note    Date:  2022  Patient Name:  Ana Maria Santos    :  1975  MRN: [id number]  Physician: Dr Armas Ill: [place] Medicaid 30 v, no copay  Medical Diagnosis: RLE calf, achilles pain              Rehab Codes: Y36.082, M76.61  Onset date: Dec 2021                                               Next 's appt.: 22  Visit# / total visits: 3/20     Cancels/No Shows: 2    Subjective:    Pain:  [] Yes  [x] No Location: RLE  Pain Rating: (0-10 scale) 0/10  Pain altered Tx:  [x] No  [] Yes  Action:  Comments: Patient reports no pain at arrival, she is feeling better overall. Mild pain 1/10 with activity. Objective:  Precautions: Standard   Exercise     RLE calf pain, tightness  Reps/ Time Weight/ Level Comments             Bike  10'                Toe yoga  x20            Total Gym Heel raises   2x10 L20    Total Gym squats  2x10 L20     Rebounder SLS  x20       Calf slant board stretch  3x30\"       Hamstring stool stretch  3x30\"       Lunges  2x10   Bilat   Lateral Heel taps  2x10  4\" Bilat    Balance board  5'  L2     Band 4 way hip RLE   x20  Green     Squats to mat  x20     Counter reach   x10 Pain on right  Bilat    Wall leans forward and backward  x10   Bilat                    Treatment Charges: Mins Units   []  Modalities     [x]  Ther Exercise 35 2   []  Manual Therapy     []  Ther Activities     []  Aquatics     []  Vasocompression     []  Other     Total Treatment time 35 2       Assessment: [x] Progressing toward goals. Progressed program with new additions as above. Reports pain at 2/10 with exercises, felt the most pain with counter reaches today. Discussed HEP and need to focus on stretches at home. [] No change.      [] Other:  [x] Patient would continue to benefit from skilled physical therapy services in order to: flexibility, strength and mobility to allow patient to have less pain with work and ADLs. Goals MET NOT MET ON-  GOING Details   Date Addressed:           STG: To be met in 10 treatments            1. ? Pain: Decrease pain levels to 2/10 with ADL/sport []  []  []      2. ? ROM: Increase flexibility and AROM limitations throughout to equal bilat to reduce difficulty with ADLs []  []  []      3. ? Strength: Increase MMT to 5/5 throughout to ease functional limitations and mobility []  []  []      4. Independent with Home Exercise Programs []  []  []        []  []  []      Date Addressed:           LTG: To be met in 20 treatments           1. Improve score on assessment tool LEFS from 56% impairment to less than 25% impairment  []  []  []      2. Reduce pain levels to 2/10 or less with ADLs []  []  []        []  []  []                        Patient goals: decrease pain      Pt. Education:  [x] Yes  [] No  [x] Reviewed Prior HEP/Ed, discussed plan for proper form with exercises   Method of Education: [x] Verbal  [] Demo  [] Written  Comprehension of Education:  [x] Verbalizes understanding. [] Demonstrates understanding. [] Needs review. [x] Demonstrates/verbalizes HEP/Ed previously given. Plan: [x] Continue current frequency toward long and short term goals. [x] Specific Instructions for subsequent treatments: continue strength progressions per tolerance.        Time In: [address]            Time Out: [address]    Electronically signed by:  Lamine Santizo PT

## 2022-08-01 ENCOUNTER — HOSPITAL ENCOUNTER (OUTPATIENT)
Dept: PHYSICAL THERAPY | Facility: CLINIC | Age: 47
Setting detail: THERAPIES SERIES
Discharge: HOME | End: 2022-08-01
Payer: COMMERCIAL

## 2022-08-01 PROCEDURE — 97110 THERAPEUTIC EXERCISES: CPT

## 2022-08-01 NOTE — CONSULTS
[x] THE [facility] &  Therapy  [facility]   [place]: [phone]  F: [phone]      Physical Therapy Spine Evaluation    Date:  2022  Patient: Fannie Rivas  : 1975  MRN: [id number]  Physician: Dr Shira Nichols: Darell Collins Diagnosis: [address] Codes: S16. 1XXA, M62.81 (Muscle Weakness), M62.9 (Disorder of Muscle), M79.1 (Myalgia)  Onset Date: 2022  Next 's appt. : -      Subjective:   CC/HPI: Pt has left side neck pain in the shoulder, started in mid 2022 tried steroid and muscle relaxors and steroid did not help. She has tried heat, ice, tylenol but continues to have symptoms. She started with a Chiropractor today for the pain, used an actuator for the pain. Plans to see them once a week for adjustments and acupuncture. PMHx:              [x] Refer to full medical chart  In EPIC       Medications: [x] Refer to full medical record [] None [] Other:  Allergies:      [x] Refer to full medical record [] None [] Other:    ADL/IADL [x] Previously independent with all [x] Currently independent with all Who currently assists the patient with task     [] Previously independent with all except: [] Currently independent with all except:     Bathing  [] Assist [] Assist     Dress/grooming [] Assist [] Assist     Transfer/mobility [] Assist [] Assist     Feeding [] Assist [] Assist     Toileting [] Assist [] Assist     Driving [] Assist [] Assist     Housekeeping [] Assist [] Assist     Grocery shop/meal prep [] Assist [] Assist        Function:  Hand Dominance  [x] Right  [] Left    Pain present?  yes   Location Left side neck from mid-cervical to upper thoracic    Pain Rating currently 7/10 after Chiro, 9/10 before    Pain at worse 9/10   Pain at best 3/10   Description of pain Dull ache, occaisonally becomes sharp    Altered Sensation intact   What makes it worse Turn head to the left    What makes it better Rest, meds   Sleep Wakes her from sleep           Objective:      STRENGTH    Left Right   C5   Shld Abd 4+ 5   Shld Flexion 4 4+   Shld IR 5 5   Shld ER 5 5   C6 Elb Flex 5 5   C7 Elb Ext 5 5        Shoulder:  Retraction  4- 4-   Extension 4- 4-   HAB 4- 4-           Cervical ROM Left Right   Flexion Vigil 25%     Extension Vigil 25%     Rotation  Vigil 75% Vigil 25%   Sidebend  Vigil 75% Vigil 25%   Retraction          OBSERVATION No Deficit Deficit Not Tested Comments   Posture       Forward Head [] [x] []    Rounded Shoulders [] [x] []    Kyphosis [] [x] []    Slumped sitting [] [x] []          Flexibility Normal Left tight Right tight   Upper Trap [] [x] [x]   Scalenes [] [x] [x]   L.Scap [] [x] [x]   Pec Minor [] [x] [x]       FUNCTION Normal Difficult Unable   Lift/Carry [] [x] []    [] [] []   OH reach [] [x] []       Functional Test: NDI Score: 46% functionally impaired         Assessment:  Patient would benefit from skilled physical therapy services in order to: improve ROM, flexibility, strength    Problems:    [x] ? Pain  [x] ? ROM  [x] ? Strength  [x] ? Function      Goals  MET NOT MET ON-  GOING  Details   Date Addressed:        STG: To be met in 10 treatments           1. ? Pain: Decrease pain levels to 4/10 with ADLs []  []  []      2. ? ROM: Increase flexibility and AROM limitations throughout to equal bilat to reduce difficulty with ADLs []  []  []      3. ? Strength: Increase MMT to 5/5 throughout to ease functional limitations and mobility  []  []  []     4. Independent with Home Exercise Programs []  []  []      []  []  []     Date Addressed:        LTG: To be met in 20 treatments       1. Improve score on assessment tool NDI from 46% impairment to less than 15% impairment  []  []  []     2.  Reduce pain levels to 1-2/10 or less with ADLs []  []  []      []  []  []                 Patient goals: decrease pain     Rehab Potential:  [x] Good  [] Fair  [] Poor   Suggested Professional Referral:  [x] No  [] treatment: [facility] stretches and strengthening program     Evaluation Complexity:  History (Personal factors, comorbidities) [x] 0 [] 1-2 [] 3+   Exam (limitations, restrictions) [x] 1-2 [] 3 [] 4+   Clinical presentation (progression) [x] Stable [] Evolving  [] Unstable   Decision Making [x] Low [] Moderate [] High    [x] Low Complexity [] Moderate Complexity [] High Complexity       Treatment Charges: Mins Units   [] Evaluation       []  Low       []  Moderate       []  High     []  Modalities     [x]  Ther Exercise 45 3   []  Manual Therapy     []  Ther Activities     []  Aquatics     []  Vasocompression     []  Other       TOTAL TREATMENT TIME: 45      Time in: 1800      Time out: [address] [facility]    Electronically signed by: Zonia Hester PT    Physician Signature:________________________________Date:__________________  By signing above or cosigning this note, I have reviewed this plan of care and certify a need for medically necessary rehabilitation services.      *PLEASE SIGN ABOVE AND FAX BACK ALL PAGES*

## 2022-08-04 ENCOUNTER — HOSPITAL ENCOUNTER (OUTPATIENT)
Dept: PHYSICAL THERAPY | Facility: CLINIC | Age: 47
Setting detail: THERAPIES SERIES
Discharge: HOME | End: 2022-08-04
Payer: COMMERCIAL

## 2022-08-04 NOTE — FLOWSHEET NOTE
[x] [facility]  Outpatient Rehabilitation &  Therapy  [facility]   [place]: [phone]  F: [phone]      Physical Therapy Cancel/No Show note    Date: 2022  Patient: William Diez  : 1975  MRN: [id number]    Cancels/No Shows to date: 3    For today's appointment patient:    [x]  Cancelled    [] Rescheduled appointment    [] No-show     Reason given by patient:    []  Patient ill    []  Conflicting appointment    [] No transportation      [] Conflict with work    [] No reason given    [] Weather related    [] COVID-19    [x] Other:      Comments: Pt states she has to take her dog to the vet an hr away.        [] Next appointment was confirmed    Electronically signed by: Bj Calabrese

## 2022-08-08 ENCOUNTER — HOSPITAL ENCOUNTER (OUTPATIENT)
Dept: PHYSICAL THERAPY | Facility: CLINIC | Age: 47
Setting detail: THERAPIES SERIES
Discharge: HOME | End: 2022-08-08
Payer: COMMERCIAL

## 2022-08-08 PROCEDURE — 97110 THERAPEUTIC EXERCISES: CPT

## 2022-08-08 NOTE — FLOWSHEET NOTE
[x] THE [facility] &  Therapy  [facility]   [place]: [phone]  F: [phone]      Physical Therapy Daily Treatment Note    Date:  2022  Patient Name:  Ana Maria Santos    :  1975  MRN: [id number]  Physician: Dr Armas Ill: [place] Medicaid 30 v, no copay  Medical Diagnosis: RLE calf, achilles pain, Neck pain               Rehab Codes: M79.661, M76.61,  S16. 1XXA, M62.81 (Muscle Weakness), M62.9 (Disorder of Muscle), M79.1 (Myalgia)  Onset date: Dec 2021                                               Next 's appt.: 22  Visit# / total visits:      Cancels/No Shows: 2    Subjective:    Pain:  [] Yes  [x] No Location: RLE Calf, neck  Pain Rating: (0-10 scale) 6/10  Pain altered Tx:  [x] No  [] Yes  Action:  Comments: Patient reports no calf pain but pain in the neck to be 6/10. She went to see her Chiropractor for manual and acupuncture.        Objective:  Precautions: Standard   Exercise     RLE calf pain, tightness  Reps/ Time Weight/ Level Comments             Bike  10'                Toe yoga  x20            Total Gym Heel raises   2x10 L20    Total Gym squats  2x10 L20     Rebounder SLS  x20       Calf slant board stretch  3x30\"       Hamstring stool stretch  3x30\"       Lunges  2x10   Bilat   Lateral Heel taps  2x10  4\" Bilat    Balance board  5'  L2     Band 4 way hip RLE   x20  Blue      Squats to mat  x20     Counter reach   x10 Pain on right  Bilat    Wall leans forward and backward  x10   Bilat            Exercise     L side neck pain Reps/ Time Weight/ Level Comments             Doorway Pectoral Stretch  30\"x3   HEP   Scapular Retractions  x20   HEP   Supine Chin Tucks x20       Upper Trap Stretch  30\"x3   HEP   Levator Scap Stretch      HEP             Prone         Scap Retractions         Scap Depressions          Rows         Ext         HAB                   Bands         Rows x20  green      Extension  x20 green      IR x20  green      ER x20  green                            Treatment Charges: Mins Units   []  Modalities     [x]  Ther Exercise 48 3   []  Manual Therapy     []  Ther Activities     []  Aquatics     []  Vasocompression     []  Other     Total Treatment time 48 3       Assessment: [x] Progressing toward goals. Progressed program with new additions as above. Added midback strengthening with cues needed, patient tolerated well once corrected on form. Discussed HEP and need to focus on stretches at home. [] No change. [] Other:  [x] Patient would continue to benefit from skilled physical therapy services in order to: flexibility, strength and mobility to allow patient to have less pain with work and ADLs. Goals MET NOT MET ON-  GOING Details   Date Addressed:           STG: To be met in 10 treatments            1. ? Pain: Decrease pain levels to 2/10 with ADL/sport []  []  []      2. ? ROM: Increase flexibility and AROM limitations throughout to equal bilat to reduce difficulty with ADLs []  []  []      3. ? Strength: Increase MMT to 5/5 throughout to ease functional limitations and mobility []  []  []      4. Independent with Home Exercise Programs []  []  []        []  []  []      Date Addressed:           LTG: To be met in 20 treatments           1. Improve score on assessment tool LEFS from 56% impairment to less than 25% impairment  []  []  []      2. Reduce pain levels to 2/10 or less with ADLs []  []  []        []  []  []                        Patient goals: decrease pain      Pt. Education:  [x] Yes  [] No  [x] Reviewed Prior HEP/Ed, UE band exercises given    Method of Education: [x] Verbal  [x] Demo  [x] Written  Comprehension of Education:  [x] Verbalizes understanding. [] Demonstrates understanding. [] Needs review. [x] Demonstrates/verbalizes HEP/Ed previously given. Access Code: [email or web address]  URL: [email or web address]  Date: 08/08/2022  Prepared by: Link Shaffer Villa    Exercises  Scapular Retraction with Resistance - 1 x daily - 7 x weekly - 10 reps - 3 sets  Shoulder Extension with Resistance - 1 x daily - 7 x weekly - 10 reps - 3 sets  Shoulder External Rotation with Anchored Resistance - 1 x daily - 7 x weekly - 10 reps - 3 sets  Shoulder Internal Rotation with Resistance - 1 x daily - 7 x weekly - 10 reps - 3 sets       Plan: [x] Continue current frequency toward long and short term goals. [x] Specific Instructions for subsequent treatments: continue strength progressions per tolerance.        Time In: [address]              Time Out: [address]    Electronically signed by:  Giuliana Gomez, PT

## 2022-08-10 NOTE — TELEPHONE ENCOUNTER
Patient called and cancelled colonoscopy   Booked 8/11/22 due to recent covid exposure.   Patient will call back to reschedule

## 2022-08-11 ENCOUNTER — APPOINTMENT (OUTPATIENT)
Dept: PHYSICAL THERAPY | Facility: CLINIC | Age: 47
End: 2022-08-11
Payer: COMMERCIAL

## 2022-08-15 ENCOUNTER — HOSPITAL ENCOUNTER (OUTPATIENT)
Dept: PHYSICAL THERAPY | Facility: CLINIC | Age: 47
Setting detail: THERAPIES SERIES
Discharge: HOME | End: 2022-08-15
Payer: COMMERCIAL

## 2022-08-15 PROCEDURE — 97110 THERAPEUTIC EXERCISES: CPT

## 2022-08-15 NOTE — FLOWSHEET NOTE
[x] THE [facility] &  Therapy  [facility]   [place]: [phone]  F: [phone]      Physical Therapy Daily Treatment Note    Date:  8/15/2022  Patient Name:  Mireya Morales    :  1975  MRN: [id number]  Physician: Dr Melissa Major: [place] Medicaid 30 v, no copay  Medical Diagnosis: RLE calf, achilles pain, Neck pain               Rehab Codes: M79.661, M76.61,  S16. 1XXA, M62.81 (Muscle Weakness), M62.9 (Disorder of Muscle), M79.1 (Myalgia)  Onset date: Dec 2021                                               Next 's appt.: 22  Visit# / total visits:      Cancels/No Shows: 2    Subjective:    Pain:  [] Yes  [x] No Location: RLE Calf, neck  Pain Rating: (0-10 scale) 3/10  Pain altered Tx:  [x] No  [] Yes  Action:  Comments: Patient reports no calf pain today, mild soreness in the neck and calf. She went to see her Chiropractor for manual and acupuncture before coming to the appointment today.        Objective:  Precautions: Standard   Exercise     RLE calf pain, tightness  Reps/ Time Weight/ Level Comments             Bike  10'                Toe yoga  x20            Total Gym Heel raises   2x10 L20    Total Gym squats  2x10 L20     Rebounder SLS  x20       Calf slant board stretch  3x30\"       Hamstring stool stretch  3x30\"       Lunges  2x10   Bilat   Lateral Heel taps  2x10  4\" Bilat    Balance board  5'  L2     Band 4 way hip RLE   x20  Blue      Squats to mat  x20           Wall leans forward and backward  x10   Bilat    Cones x3             Exercise     L side neck pain Reps/ Time Weight/ Level Comments             Doorway Pectoral Stretch  30\"x3   HEP   Scapular Retractions  x20   HEP   Supine Chin Tucks x20       Upper Trap Stretch  30\"x3   HEP   Levator Scap Stretch      HEP             Prone         Rows  x20  3#     Ext  x20  3#     HAB  x20  3#     Scaption  x20  3#              Bands         Rows x20  green Extension  x20  green      IR x20  green      ER x20  green                        Treatment Charges: Mins Units   []  Modalities     [x]  Ther Exercise 48 3   []  Manual Therapy     []  Ther Activities     []  Aquatics     []  Vasocompression     []  Other     Total Treatment time 48 3       Assessment: [x] Progressing toward goals. Progressed program with new additions as above. Added midback strengthening in prone program today with cues for proper form. Patient able to tolerate well. Discussed HEP and need to focus on stretches at home. [] No change. [] Other:  [x] Patient would continue to benefit from skilled physical therapy services in order to: flexibility, strength and mobility to allow patient to have less pain with work and ADLs. Goals MET NOT MET ON-  GOING Details   Date Addressed:           STG: To be met in 10 treatments            1. ? Pain: Decrease pain levels to 2/10 with ADL/sport []  []  []      2. ? ROM: Increase flexibility and AROM limitations throughout to equal bilat to reduce difficulty with ADLs []  []  []      3. ? Strength: Increase MMT to 5/5 throughout to ease functional limitations and mobility []  []  []      4. Independent with Home Exercise Programs []  []  []        []  []  []      Date Addressed:           LTG: To be met in 20 treatments           1. Improve score on assessment tool LEFS from 56% impairment to less than 25% impairment  []  []  []      2. Reduce pain levels to 2/10 or less with ADLs []  []  []        []  []  []                        Patient goals: decrease pain      Pt. Education:  [x] Yes  [] No  [x] Reviewed Prior HEP/Ed, prone bench exercises   Method of Education: [x] Verbal  [x] Demo  [x] Written  Comprehension of Education:  [x] Verbalizes understanding. [] Demonstrates understanding. [] Needs review. [x] Demonstrates/verbalizes HEP/Ed previously given. Access Code: [email or web address]  URL: [email or web address]  Date:

## 2022-08-17 ENCOUNTER — APPOINTMENT (OUTPATIENT)
Dept: PHYSICAL THERAPY | Facility: CLINIC | Age: 47
End: 2022-08-17
Payer: COMMERCIAL

## 2022-08-22 ENCOUNTER — HOSPITAL ENCOUNTER (OUTPATIENT)
Dept: PHYSICAL THERAPY | Facility: CLINIC | Age: 47
Setting detail: THERAPIES SERIES
Discharge: HOME | End: 2022-08-22
Payer: COMMERCIAL

## 2022-08-22 PROCEDURE — 97110 THERAPEUTIC EXERCISES: CPT

## 2022-08-22 NOTE — FLOWSHEET NOTE
[x] THE [facility] &  Therapy  [facility]   [place]: [phone]  F: [phone]      Physical Therapy Daily Treatment Note    Date:  2022  Patient Name:  Xavier Trammell    :  1975  MRN: [id number]  Physician: Dr Fabi Ahmadi: [place] Medicaid 30 v, no copay  Medical Diagnosis: RLE calf, achilles pain, Neck pain               Rehab Codes: M79.661, M76.61,  S16. 1XXA, M62.81 (Muscle Weakness), M62.9 (Disorder of Muscle), M79.1 (Myalgia)  Onset date: Dec 2021                                               Next 's appt.: 22    Visit# / total visits:      Cancels/No Shows: 2    Subjective:    Pain:  [] Yes  [x] No Location: RLE Calf, neck  Pain Rating: (0-10 scale) 3/10  Pain altered Tx:  [x] No  [] Yes  Action:  Comments: Patient arrived noting no pain or soreness in RLE, notes continued issues with L shoulder. 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        Objective:  Precautions: Standard   Exercise     RLE calf pain, tightness  Reps/ Time Weight/ Level Comments             Bike  5'                      Total Gym Heel raises   2x10 L20    Total Gym squats  2x10 L20     Rebounder SLS  x20       Calf slant board stretch  3x30\"       Hamstring stool stretch  3x30\"       Lunges  2x10   Bilat   Lateral Heel taps  2x10  4\" Bilat    Balance board  5'  L2     Band 4 way hip RLE   x20  Blue      Squats to mat  x20           Wall leans forward and backward  x10   Bilat    Cones x3           HELD LE PROGRAM THIS DATE DUE TO TIME CONSTRAINT      Exercise     L side neck pain Reps/ Time Weight/ Level Comments             Doorway Pectoral Stretch  30\"x3   HEP   Scapular Retractions  x20   HEP   Supine Chin Tucks x20       Upper Trap Stretch  30\"x3   HEP   Levator Scap Stretch      HEP             Prone         Rows  x20  3#     Ext  x20  3#     HAB  x20  3#     Scaption  x20  3#              Bands         Rows x20  green Extension  x20  green      IR x20  green      ER x20  green                Standing      Flexion 2x10  1#    Scaption 2x10  1#    Abduction 2x10  1#                              Treatment Charges: Mins Units   []  Modalities     [x]  Ther Exercise 25 2   []  Manual Therapy     []  Ther Activities     []  Aquatics     []  Vasocompression     []  Other     Total Treatment time 25 2       Assessment: [x] Progressing toward goals. Focused on UE program this date due to time constraint and no issues noted with LE. Patient notes no pain with progressions this date just fatigue. Less cues needed this date for recall and technique with strength program this date. [] No change. [] Other:  [x] Patient would continue to benefit from skilled physical therapy services in order to: flexibility, strength and mobility to allow patient to have less pain with work and ADLs. Goals MET NOT MET ON-  GOING Details   Date Addressed:           STG: To be met in 10 treatments            1. ? Pain: Decrease pain levels to 2/10 with ADL/sport []  []  []      2. ? ROM: Increase flexibility and AROM limitations throughout to equal bilat to reduce difficulty with ADLs []  []  []      3. ? Strength: Increase MMT to 5/5 throughout to ease functional limitations and mobility []  []  []      4. Independent with Home Exercise Programs []  []  []        []  []  []      Date Addressed:           LTG: To be met in 20 treatments           1. Improve score on assessment tool LEFS from 56% impairment to less than 25% impairment  []  []  []      2. Reduce pain levels to 2/10 or less with ADLs []  []  []        []  []  []                        Patient goals: decrease pain      Pt. Education:  [x] Yes  [] No  [x] Reviewed Prior HEP/Ed continued focus on HEP and stretching. Method of Education: [x] Verbal  [x] Demo  [] Written  Comprehension of Education:  [x] Verbalizes understanding. [] Demonstrates understanding. [] Needs review.   [x] Demonstrates/verbalizes HEP/Ed previously given. Plan: [x] Continue current frequency toward long and short term goals. [x] Specific Instructions for subsequent treatments: continue strength progressions per tolerance.        Time In: 1805             Time Out: 1830    Electronically signed by:  Josue Fitzgerald PTA

## 2022-08-25 ENCOUNTER — OFFICE VISIT (OUTPATIENT)
Dept: ORTHOPEDIC SURGERY | Age: 47
End: 2022-08-25
Payer: COMMERCIAL

## 2022-08-25 ENCOUNTER — HOSPITAL ENCOUNTER (OUTPATIENT)
Dept: PHYSICAL THERAPY | Facility: CLINIC | Age: 47
Setting detail: THERAPIES SERIES
Discharge: HOME | End: 2022-08-25
Payer: COMMERCIAL

## 2022-08-25 VITALS — WEIGHT: 118 LBS | HEIGHT: 67 IN | OXYGEN SATURATION: 100 % | RESPIRATION RATE: 16 BRPM | BODY MASS INDEX: 18.52 KG/M2

## 2022-08-25 DIAGNOSIS — M79.661 RIGHT CALF PAIN: ICD-10-CM

## 2022-08-25 DIAGNOSIS — M62.89 TIGHTNESS OF RIGHT GASTROCNEMIUS MUSCLE: ICD-10-CM

## 2022-08-25 DIAGNOSIS — M76.61 ACHILLES TENDINITIS OF RIGHT LOWER EXTREMITY: Primary | ICD-10-CM

## 2022-08-25 PROCEDURE — 1036F TOBACCO NON-USER: CPT | Performed by: ORTHOPAEDIC SURGERY

## 2022-08-25 PROCEDURE — G8427 DOCREV CUR MEDS BY ELIG CLIN: HCPCS | Performed by: ORTHOPAEDIC SURGERY

## 2022-08-25 PROCEDURE — 99212 OFFICE O/P EST SF 10 MIN: CPT | Performed by: ORTHOPAEDIC SURGERY

## 2022-08-25 PROCEDURE — G8419 CALC BMI OUT NRM PARAM NOF/U: HCPCS | Performed by: ORTHOPAEDIC SURGERY

## 2022-08-25 NOTE — FLOWSHEET NOTE
[x] formerly [facility]  Outpatient Rehabilitation &  Therapy  [facility]   [place]: [phone]  F: [phone]      Physical Therapy Cancel/No Show note    Date: 2022  Patient: Lucy Angel  : 1975  MRN: [id number]    Cancels/No Shows to date:     For today's appointment patient:    [x]  Cancelled    [] Rescheduled appointment    [] No-show     Reason given by patient:    []  Patient ill    []  Conflicting appointment    [] No transportation      [x] Conflict with work    [] No reason given    [] Weather related    [] COVID-19    [] Other:      Comments:       [] Next appointment was confirmed    Electronically signed by: Luzma Garces PTA

## 2022-08-25 NOTE — PROGRESS NOTES
[address] [place] [address] AND SPORTS MEDICINE  [facility] Ximena Stevens  [address] [id number]  Dept: [phone]    Ambulatory Orthopedic Consult      CHIEF COMPLAINT:    Chief Complaint   Patient presents with    Leg Pain     Right       HISTORY OF PRESENT ILLNESS:      The patient is a 50 y.o. female who is being seen for evaluation of the above, which began around September 2021 atraumatically. At today's visit, she is using no brace/assistive device. History is obtained today from:   [x]  the patient     [x]  EMR     []  one family member/friend    []  multiple family members/friends    []  other: At today's visit, she localizes her pain to the right posterior calf. The patient is referred here today by Dr. Paulina Foy. INTERVAL HISTORY 6/30/2022:  She is seen again today in the office for follow up of a previous issue (as above). Since being seen last, the patient is doing about the same overall. At today's visit, she is not using a brace or assistive device. History is obtained today from:   [x]  the patient     []  EMR     []  one family member/friend    []  multiple family members/friends    []  other:      INTERVAL HISTORY 8/25/2022:  She is seen again today in the office for follow up of a previous issue (as above). Since being seen last, the patient is doing better. At today's visit, she is not using a brace or assistive device. History is obtained today from:   [x]  the patient     []  EMR     []  one family member/friend    []  multiple family members/friends    []  other:        REVIEW OF SYSTEMS:  Musculoskeletal: See HPI for pertinent positives     Past Medical History:    She  has a past medical history of Anxiety, GERD (gastroesophageal reflux disease), Hashimoto's disease, and Migraine. Past Surgical History:    She  has a past surgical history that includes Tubal ligation; Partial hysterectomy; Hysterectomy;  Upper gastrointestinal endoscopy; Knee arthroscopy (Right, 06/15/2021); Knee arthroscopy (Right, 06/15/2021); and Colonoscopy.      Current Medications:     Current Outpatient Medications:     LORazepam (ATIVAN) 0.5 MG tablet, TAKE 1 TABLET BY MOUTH TWICE DAILY AS NEEDED FOR ANXIETY, Disp: 30 tablet, Rfl: 0    diclofenac sodium (VOLTAREN) 1 % GEL, Apply 4 g topically 4 times daily as needed for Pain, Disp: 200 g, Rfl: 0    SUMAtriptan (IMITREX) 100 MG tablet, TAKE 1 TABLET BY MOUTH ONCE AS NEEDED FOR MIGRAINE, Disp: 9 tablet, Rfl: 5    cyclobenzaprine (FLEXERIL) 10 MG tablet, TAKE ONE TABLET BY MOUTH EVERY 8 HOURS AS NEEDED FOR MUSCLE SPASMS, Disp: 30 tablet, Rfl: 0    BISACODYL 5 MG EC tablet, TAKE AS DIRECTED FOR BOWEL PREP, Disp: 4 tablet, Rfl: 10    diclofenac sodium (VOLTAREN) 1 % GEL, Apply 4 g topically 4 times daily as needed for Pain, Disp: 200 g, Rfl: 0    polyethylene glycol (GLYCOLAX) 17 GM/SCOOP powder, Take as directed for bowel prep, Disp: 238 g, Rfl: 0    levothyroxine (SYNTHROID) 100 MCG tablet, TAKE 1 TABLET BY MOUTH DAILY, Disp: 30 tablet, Rfl: 5    cabergoline (DOSTINEX) 0.5 MG tablet, TAKE 1/2 TABLET BY MOUTH ONCE WEEKLY, Disp: 2 tablet, Rfl: 5    dexlansoprazole (DEXILANT) 60 MG CPDR delayed release capsule, TAKE 1 CAPSULE BY MOUTH ONCE DAILY, Disp: 30 capsule, Rfl: 5    fexofenadine-pseudoephedrine (ALLEGRA-D 24HR) 180-240 MG per extended release tablet, Take 1 tablet by mouth daily, Disp: 30 tablet, Rfl: 5    tiZANidine (ZANAFLEX) 4 MG tablet, TAKE 1 TABLET BY MOUTH TWICE DAILY AS NEEDED FOR MUSCLE SPASMS, Disp: 30 tablet, Rfl: 10    sertraline (ZOLOFT) 100 MG tablet, TAKE 1 TABLET BY MOUTH ONCE DAILY, Disp: 30 tablet, Rfl: 5    diclofenac (VOLTAREN) 75 MG EC tablet, Take 1 tablet by mouth 2 times daily for 14 days, Disp: 28 tablet, Rfl: 0    cetirizine (ZYRTEC) 10 MG tablet, Take 10 mg by mouth daily, Disp: , Rfl:      Allergies:    Ibuprofen    Family History:  family history includes Cancer in her mother. Social History:   Social History     Occupational History    Not on file   Tobacco Use    Smoking status: Former     Packs/day: 1.00     Years: 13.00     Pack years: 13.00     Types: Cigarettes     Quit date:      Years since quittin.6    Smokeless tobacco: Never   Vaping Use    Vaping Use: Never used   Substance and Sexual Activity    Alcohol use: No    Drug use: No    Sexual activity: Yes     Partners: Male     Full time caregiver at     OBJECTIVE:  Resp 16   Ht 5' 7\" (1.702 m)   Wt 118 lb (53.5 kg)   SpO2 100%   BMI 18.48 kg/m²    Psych: awake, alert  Cardio:  well perfused extremities, no cyanosis  Resp:  normal respiratory effort  Musculoskeletal:    RLE:  Vascular: Limb well perfused, compartments soft/compressible. Skin: No erythema/ulcers. Intact. Neurovascular Status:  Grossly neurovascularly intact throughout   Tenderness to Palpation: None (previously: Calf greater than proximal Achilles tendon)  --Achilles:    -no nodules/thickening of Achilles tendon palpated  -no palpable gap of Achilles tendon noted  -mild pain with resisted plantarflexion        LLE:  Vascular: Limb well perfused, compartments soft/compressible. Skin: No erythema/ulcers. Intact. Neurovascular Status:  Grossly neurovascularly intact throughout   Tenderness to Palpation:    -      RADIOLOGY:   2022 No new radiology images today. Prior images reviewed for reference. FINDINGS:  Three views (AP, Mortise, and Lateral) of the right ankle and three views (AP, Oblique, Lateral) of the right foot were obtained in the office today and reviewed, revealing no acute fracture, dislocation, or radiopaque foreign body/tumor. IMPRESSION:  No acute fracture/dislocation.      Electronically signed by Tera Cushing, MD      Relevant previous report reviewed as below:    -2022 right lower extremity ultrasound:  No evidence of superficial or deep venous thrombosis in the right lower []  Custom Orthotic:               []  AZ brace                    []  Rocker Bottom      []  Night splint    []  Heel cups        []  Strap        []  Toe gizmos    []  Topl        []  NSAIDs         []  [place]        []  Ref:         []  Stress Xray    []  CT        []  MRI  []  Inj:          []  Consider OR      []  Pick OR date    No follow-ups on file. No orders of the defined types were placed in this encounter. No orders of the defined types were placed in this encounter. Patience Alvarado MD  Orthopedic Surgery        Please excuse any typos/errors, as this note was created with the assistance of voice recognition software. While intending to generate a document that actually reflects the content of the visit, the document can still have some errors including those of syntax and sound-a-like substitutions which may escape proof reading. In such instances, actual meaning can be extrapolated by context.

## 2022-08-31 ENCOUNTER — TELEPHONE (OUTPATIENT)
Dept: GASTROENTEROLOGY | Age: 47
End: 2022-08-31

## 2022-09-06 DIAGNOSIS — M76.61 ACHILLES TENDINITIS OF RIGHT LOWER EXTREMITY: ICD-10-CM

## 2022-09-13 DIAGNOSIS — M76.61 ACHILLES TENDINITIS OF RIGHT LOWER EXTREMITY: Primary | ICD-10-CM

## 2022-10-05 DIAGNOSIS — M76.61 ACHILLES TENDINITIS OF RIGHT LOWER EXTREMITY: ICD-10-CM

## 2022-10-06 DIAGNOSIS — M76.61 ACHILLES TENDINITIS OF RIGHT LOWER EXTREMITY: ICD-10-CM

## 2022-11-10 DIAGNOSIS — M76.61 ACHILLES TENDINITIS OF RIGHT LOWER EXTREMITY: ICD-10-CM

## 2022-11-11 DIAGNOSIS — M76.61 ACHILLES TENDINITIS OF RIGHT LOWER EXTREMITY: ICD-10-CM

## 2022-12-05 DIAGNOSIS — M76.61 ACHILLES TENDINITIS OF RIGHT LOWER EXTREMITY: ICD-10-CM

## 2022-12-11 DIAGNOSIS — M76.61 ACHILLES TENDINITIS OF RIGHT LOWER EXTREMITY: ICD-10-CM

## 2022-12-12 DIAGNOSIS — M76.61 ACHILLES TENDINITIS OF RIGHT LOWER EXTREMITY: ICD-10-CM

## 2023-02-01 DIAGNOSIS — M76.61 ACHILLES TENDINITIS OF RIGHT LOWER EXTREMITY: ICD-10-CM

## 2023-02-02 DIAGNOSIS — M76.61 ACHILLES TENDINITIS OF RIGHT LOWER EXTREMITY: ICD-10-CM

## 2023-05-25 ENCOUNTER — TELEPHONE (OUTPATIENT)
Dept: SURGERY | Age: 48
End: 2023-05-25

## 2023-05-26 NOTE — TELEPHONE ENCOUNTER
[address] [facility] Surgery  Screening colonoscopy questionnaire  Crista Greenwood    Pt Name: Toan uHrtado  MRN: [id number]  YOB: 1975  Primary Care Physician: Rodney Alfaro MD      Have you ever had a colonoscopy? Yes  When was your last colonoscopy? 8 YEARS AGO  Were any polyps removed or any other significant findings? NO    Have you recently had a stool test to check for colon cancer? No  Was it positive? No    Do you have any family history of colon cancer? No  If yes, which family member had colon cancer? N/A  Were they diagnosed younger or older than the age of 61? N/A    Do you have a history of Crohn's disease or Ulcerative Colitis? No    Do you have a history of constipation? No    Do you have a history of bloody or black stools? No    Have you ever had surgery done inside your abdomen? No  What surgery? N/A    8. Are you taking any blood thinners? No   If yes, what is the name of the blood thinner? N/A    Schedulin/26/23 - Spoke to patient, [facility] patient confirmed and information mailed to patient. Surgery date/time: 23 at [address] [place] [address] time: 6:30AM  Prep appt: PHONE CALL at  [employer]. GOLYTELY INSTRUCTIONS MAILED TO PATIENT.

## 2023-08-07 ENCOUNTER — TELEPHONE (OUTPATIENT)
Dept: SURGERY | Age: 48
End: 2023-08-07

## 2023-08-10 RX ORDER — POLYETHYLENE GLYCOL 3350, SODIUM SULFATE ANHYDROUS, SODIUM BICARBONATE, SODIUM CHLORIDE, POTASSIUM CHLORIDE 236; 22.74; 6.74; 5.86; 2.97 G/4L; G/4L; G/4L; G/4L; G/4L
POWDER, FOR SOLUTION ORAL
Qty: 1 EACH | Refills: 0 | Status: SHIPPED | OUTPATIENT
Start: 2023-08-10

## 2023-09-06 ENCOUNTER — HOSPITAL ENCOUNTER (OUTPATIENT)
Dept: ULTRASOUND IMAGING | Age: 48
Discharge: HOME | End: 2023-09-08
Payer: COMMERCIAL

## 2023-09-06 ENCOUNTER — HOSPITAL ENCOUNTER (OUTPATIENT)
Dept: MAMMOGRAPHY | Age: 48
Discharge: HOME | End: 2023-09-08
Payer: COMMERCIAL

## 2023-09-06 VITALS — HEIGHT: 67 IN | BODY MASS INDEX: 19.46 KG/M2 | WEIGHT: 124 LBS

## 2023-09-06 DIAGNOSIS — N63.23 MASS OF LOWER OUTER QUADRANT OF LEFT BREAST: ICD-10-CM

## 2023-09-06 DIAGNOSIS — R92.8 ABNORMAL MAMMOGRAM: ICD-10-CM

## 2023-09-06 PROCEDURE — G0279 TOMOSYNTHESIS, MAMMO: HCPCS

## 2023-09-06 PROCEDURE — 76642 ULTRASOUND BREAST LIMITED: CPT

## 2024-06-03 ENCOUNTER — HOSPITAL ENCOUNTER (OUTPATIENT)
Facility: CLINIC | Age: 49
Discharge: HOME | End: 2024-06-05
Payer: COMMERCIAL

## 2024-06-03 ENCOUNTER — HOSPITAL ENCOUNTER (OUTPATIENT)
Dept: GENERAL RADIOLOGY | Facility: CLINIC | Age: 49
Discharge: HOME | End: 2024-06-05
Payer: COMMERCIAL

## 2024-06-03 DIAGNOSIS — M25.511 ACUTE PAIN OF RIGHT SHOULDER: ICD-10-CM

## 2024-06-03 PROCEDURE — 73030 X-RAY EXAM OF SHOULDER: CPT

## 2024-09-30 ENCOUNTER — HOSPITAL ENCOUNTER (OUTPATIENT)
Dept: WOMENS IMAGING | Age: 49
Discharge: HOME | End: 2024-10-02
Payer: COMMERCIAL

## 2024-09-30 VITALS — HEIGHT: 67 IN | WEIGHT: 131 LBS | BODY MASS INDEX: 20.56 KG/M2

## 2024-09-30 DIAGNOSIS — Z12.31 BREAST CANCER SCREENING BY MAMMOGRAM: ICD-10-CM

## 2024-09-30 PROCEDURE — 77063 BREAST TOMOSYNTHESIS BI: CPT

## 2024-10-04 ENCOUNTER — HOSPITAL ENCOUNTER (OUTPATIENT)
Dept: GENERAL RADIOLOGY | Facility: CLINIC | Age: 49
Discharge: HOME | End: 2024-10-06
Payer: COMMERCIAL

## 2024-10-04 ENCOUNTER — HOSPITAL ENCOUNTER (OUTPATIENT)
Age: 49
Setting detail: SPECIMEN
Discharge: HOME | End: 2024-10-04

## 2024-10-04 ENCOUNTER — HOSPITAL ENCOUNTER (OUTPATIENT)
Facility: CLINIC | Age: 49
Discharge: HOME | End: 2024-10-06
Payer: COMMERCIAL

## 2024-10-04 DIAGNOSIS — M54.6 ACUTE BILATERAL THORACIC BACK PAIN: ICD-10-CM

## 2024-10-04 DIAGNOSIS — Z13.220 SCREENING CHOLESTEROL LEVEL: ICD-10-CM

## 2024-10-04 DIAGNOSIS — M54.50 ACUTE BILATERAL LOW BACK PAIN WITHOUT SCIATICA: ICD-10-CM

## 2024-10-04 LAB
CHOLEST SERPL-MCNC: 228 MG/DL (ref 0–199)
CHOLESTEROL/HDL RATIO: 5
HDLC SERPL-MCNC: 47 MG/DL
LDLC SERPL CALC-MCNC: 164 MG/DL (ref 0–100)
TRIGL SERPL-MCNC: 87 MG/DL
VLDLC SERPL CALC-MCNC: 17 MG/DL

## 2024-10-04 PROCEDURE — 72070 X-RAY EXAM THORAC SPINE 2VWS: CPT

## 2024-10-04 PROCEDURE — 72100 X-RAY EXAM L-S SPINE 2/3 VWS: CPT

## 2024-11-15 ENCOUNTER — OFFICE VISIT (OUTPATIENT)
Dept: NEUROSURGERY | Age: 49
End: 2024-11-15
Payer: COMMERCIAL

## 2024-11-15 VITALS
BODY MASS INDEX: 21 KG/M2 | DIASTOLIC BLOOD PRESSURE: 71 MMHG | HEART RATE: 108 BPM | WEIGHT: 133.8 LBS | SYSTOLIC BLOOD PRESSURE: 109 MMHG | HEIGHT: 67 IN

## 2024-11-15 DIAGNOSIS — M47.26 OTHER SPONDYLOSIS WITH RADICULOPATHY, LUMBAR REGION: Primary | ICD-10-CM

## 2024-11-15 PROCEDURE — G8420 CALC BMI NORM PARAMETERS: HCPCS

## 2024-11-15 PROCEDURE — G8484 FLU IMMUNIZE NO ADMIN: HCPCS

## 2024-11-15 PROCEDURE — 99204 OFFICE O/P NEW MOD 45 MIN: CPT

## 2024-11-15 PROCEDURE — 3017F COLORECTAL CA SCREEN DOC REV: CPT

## 2024-11-15 PROCEDURE — G8427 DOCREV CUR MEDS BY ELIG CLIN: HCPCS

## 2024-11-15 PROCEDURE — 1036F TOBACCO NON-USER: CPT

## 2024-11-15 ASSESSMENT — ENCOUNTER SYMPTOMS: BACK PAIN: 1

## 2024-11-15 NOTE — PROGRESS NOTES
body mass index is 20.95 kg/m² as calculated from the following:    Height as of this encounter: 1.702 m (5' 7.01\").    Weight as of this encounter: 60.7 kg (133 lb 12.8 oz).    General:  Macarena Espinal is a 49 y.o. year old female who appears her stated age.   HEENT: Normocephalic atraumatic. Neck supple.  Chest: regular rate; pulses equal  Abdomen: Soft nontender nondistended.  Ext: DP and PT pulses 2+, good cap refill  Neuro    Mentation  Appropriate affect  Registration intact  Orientation intact  Judgment intact to situation    Cranial Nerves:   Pupils equal and reactive to light  Extraocular motion intact  Face and shrug symmetric  Tongue midline  No dysarthria  v1-3 sensation symmetric, masseter tone symmetric  Hearing symmetric    Sensation: intact    Motor  L deltoid 5/5; R deltoid 5/5  L biceps 5/5; R biceps 5/5  L triceps 5/5; R triceps 5/5  L wrist extension 5/5; R wrist extension 5/5  L intrinsics 5/5; R intrinsics 5/5     L hip flexion 5/5 , R hip flexion 5/5  L knee extension 5/5; R knee extension 5/5  L Dorsiflexion 5/5; R dorsiflexion 5/5  L Plantarflexion 5/5; R plantarflexion 5/5  L EHL 5/5; R EHL 5/5    Reflexes  L Brachioradialis 2+/4; R brachioradialis 2+/4  L Biceps 2+/4; R Biceps 2+/4  L Triceps 2+/4; R Triceps 2+/4  L Patellar 2+/4: R Patellar 2+/4  L Achilles 2+/4; R Achilles 2+/4    hoffmans L: neg  hoffmans R: neg  Clonus L: neg  Clonus R: neg  Babinski L: neg  Babinski R: neg    +SLR left  +EMA left  Antalgic Gait      Studies Review:     10/4/24 Xray Thoracic and Lumbar Spine  FINDINGS:  Thoracic spine: Thoracic vertebral body height and alignment is maintained.  There are scattered degenerative changes throughout the thoracic spine.  No  focal osseous abnormality.  Paravertebral soft tissue structures are  unremarkable.     Lumbar spine: 5 non-rib-bearing lumbar type vertebral elements.  Vertebral  body height and alignment is maintained.  There is intervertebral disc height  loss and

## 2024-11-19 ENCOUNTER — HOSPITAL ENCOUNTER (OUTPATIENT)
Age: 49
Discharge: HOME | End: 2024-11-21
Payer: COMMERCIAL

## 2024-11-19 ENCOUNTER — HOSPITAL ENCOUNTER (OUTPATIENT)
Dept: GENERAL RADIOLOGY | Age: 49
Discharge: HOME | End: 2024-11-21
Payer: COMMERCIAL

## 2024-11-19 DIAGNOSIS — M47.26 OTHER SPONDYLOSIS WITH RADICULOPATHY, LUMBAR REGION: ICD-10-CM

## 2024-11-19 PROCEDURE — 72110 X-RAY EXAM L-2 SPINE 4/>VWS: CPT

## 2024-11-27 ENCOUNTER — TELEPHONE (OUTPATIENT)
Dept: NEUROSURGERY | Age: 49
End: 2024-11-27

## 2024-11-27 DIAGNOSIS — M47.26 OTHER SPONDYLOSIS WITH RADICULOPATHY, LUMBAR REGION: Primary | ICD-10-CM

## 2024-11-27 RX ORDER — LIDOCAINE 50 MG/G
1 PATCH TOPICAL DAILY
Qty: 30 PATCH | Refills: 0 | Status: SHIPPED | OUTPATIENT
Start: 2024-11-27 | End: 2024-11-27 | Stop reason: CLARIF

## 2024-11-27 RX ORDER — LIDOCAINE 50 MG/G
1 PATCH TOPICAL DAILY
Qty: 30 PATCH | Refills: 0 | Status: SHIPPED | OUTPATIENT
Start: 2024-11-27 | End: 2024-12-27

## 2024-11-27 NOTE — TELEPHONE ENCOUNTER
Pt reports that her quality of life is going downhill due to the amount of pain she is currently in, states she has a TENS unit at home she is using. Pt reports she cannot take Naproxen at all (vomited) and is starting PT soon but wondering what else we can do for her? Please advise

## 2024-12-05 ENCOUNTER — HOSPITAL ENCOUNTER (OUTPATIENT)
Dept: PHYSICAL THERAPY | Age: 49
Setting detail: THERAPIES SERIES
Discharge: HOME | End: 2024-12-05
Payer: COMMERCIAL

## 2024-12-05 PROCEDURE — 97161 PT EVAL LOW COMPLEX 20 MIN: CPT

## 2024-12-05 PROCEDURE — 97110 THERAPEUTIC EXERCISES: CPT

## 2024-12-05 NOTE — CONSULTS
[x] [facility]                Therapy       [address]       Phone: [phone]       Fax: [phone] [] [facility]       Promotion at [facility]       [address]          [address]       Phone: [phone]       Fax: [phone] [] [facility]    [address]     Phone: [phone]     Fax:  [phone]     Physical Therapy Spine Evaluation    Date:  2024  Patient: Macarena Espinal  : 1975  MRN: [id number]  Physician: Ronit Dominique APRN - CNP      Insurance: [facility] (30vs)  Medical Diagnosis: M47.26 (ICD-10-CM) - Other spondylosis with radiculopathy, lumbar region Rehab Codes: M54.5, R26.89, M62.81, M62.838  Onset Date: 24  Next 's appt.: TBD    Subjective:    CC: Pt arrives for physical therapy evaluation of low back pain radiating into LLE after sustaining a fall 3 months ago - fell down steps and landed on her left arm/LLE. Pain located in posterior glute down throughout posterior leg and into dorsum of L foot. States she's had 2-3 other falls since from \"being a klutz\" and reporting her body was moving too fast. States she's constantly having to shift her posture, often will need to use motorized cart at grocery store. States that lifting her left glute up when transferring seems to help. Notes when going to sit on 's lap and had pressure on it, it was sharp/shooting pain. Pain reported to be constant, worsened with duties as a  provider. Notes she wakes up in pain, goes to sleep in pain. Sees chiropractor and massage therapy for current condition as well with improvement reported intermittently.        Prior Level of Function: working without pain, minimal to no pain      Current Level of Function: prolonged sitting >1 hr, standing or walking for longer periods painful, pain with stooping/bending, floor transfers; pain worsened

## 2024-12-12 ENCOUNTER — HOSPITAL ENCOUNTER (OUTPATIENT)
Dept: PHYSICAL THERAPY | Age: 49
Setting detail: THERAPIES SERIES
Discharge: HOME | End: 2024-12-12
Payer: COMMERCIAL

## 2024-12-12 PROCEDURE — 97110 THERAPEUTIC EXERCISES: CPT

## 2024-12-12 NOTE — FLOWSHEET NOTE
[x] [facility]  Outpatient Rehabilitation &  Therapy  [address]  [phone]  [phone] [] [facility]  Outpatient Rehabilitation &  Therapy  [address]  P: [phone]  F: [phone] [] [facility]  Outpatient Rehabilitation &  Therapy  [address]  P: [phone]  F: [phone] [] [facility]  Outpatient Rehabilitation &  Therapy  [address]  [phone]  [phone] [] [facility]  Outpatient Rehabilitation &  Therapy  [address]   P: [phone]  F: [phone]  [] [facility]  Outpatient Rehabilitation &  Therapy  [address]  P: [phone]  F: [phone] [] [facility]  Outpatient Rehabilitation &  Therapy  [address]  P: [phone]  F: [phone] [] [facility]  Outpatient Rehabilitation &  Therapy  [address]  P: [phone]  F: [phone] [] [facility]  Outpatient Rehabilitation &  Therapy  [address]  P: [phone]  F: [phone]  [] [facility] Outpatient Rehabilitation &  Therapy  [address]  P: [phone]  F: [phone]     Physical Therapy Daily Treatment Note    Date:  2024  Patient Name:  Macarena Espinal    :  1975  MRN: [id number]  Physician: Ronit Dominique APRN - LAURYN                                     Insurance: [facility] (30vs)  Medical Diagnosis: M47.26 (ICD-10-CM) - Other spondylosis with radiculopathy, lumbar region Rehab Codes: M54.5, R26.89, M62.81, M62.838  Onset Date: 24                 Next 's appt.: TBD  Visit# / total visits:     Cancels/No Shows: 0    Subjective:    Pain:  [x] Yes  [] No Location: Left low back down to posterior knee   Pain Rating: (0-10 scale) not rated/10  Pain altered Tx:  []

## 2024-12-18 ENCOUNTER — HOSPITAL ENCOUNTER (OUTPATIENT)
Dept: PHYSICAL THERAPY | Age: 49
Setting detail: THERAPIES SERIES
Discharge: HOME | End: 2024-12-18
Payer: COMMERCIAL

## 2024-12-18 PROCEDURE — 97110 THERAPEUTIC EXERCISES: CPT

## 2024-12-18 NOTE — FLOWSHEET NOTE
[x] [facility]  Outpatient Rehabilitation &  Therapy  [address]  [phone]  [phone]     Physical Therapy Daily Treatment Note    Date:  2024  Patient Name:  Macarena Espinal      :  1975    MRN: [id number]  Physician: Ronit Dominique APRN - LAURYN                                     Insurance: [facility] (30vs)  Medical Diagnosis: M47.26 (ICD-10-CM) - Other spondylosis with radiculopathy, lumbar region   Rehab Codes: M54.5, R26.89, M62.81, M62.838  Onset Date: 24                  Next 's appt.: TBD    Visit# / total visits: 3/12  Cancels/No Shows: 0    Subjective:    Pain:  [x] Yes  [] No Location: Left low back down to posterior knee   Pain Rating: (0-10 scale) 6/10  Pain altered Tx:  [x] No  [] Yes  Action:   Comments: Still a bit stiff/painful in her back. Was on the floor a lot today with her  kids, which caused some increased stiffness as well. Works on SANDIP and press ups.      Objective:  Modalities:   Precautions: concordant signs: lumbar flexion, L lumbar sidebend AROM; gait with limp on LLE  Exercises:  Exercise Reps/ Time Weight/ Level Comments   Prone lying 5x1 min    alternating with SANDIP  TPR throughout piriformis during prone laying   Prone on elbows - hold 5x1 min            Prone press up 5x10  Minimal range,    Prone lying 5x1 min  Alternating with prone press up         Standing lumbar extension 5x10           Seated      Long Seated Hamstring Stretch  3x30\"   2 rounds   1x R LE - not as much stretch felt  3x L LE   Piriformis Stretch 3x30\" ea  2 rounds  Figure 4 position          Supine      LLD Assessment X  87.5 R LE  88.4 L LE         Other:                 Treatment Charges: Mins Units   []  Modalities       [x]  Ther Exercise 47 3   []  Neuromuscular Re-ed     []  Gait Training     []  Manual Therapy     []  Ther Activities     []  Aquatics     []  Vasocompression     []  Cervical Traction     []  Other

## 2024-12-20 ENCOUNTER — HOSPITAL ENCOUNTER (OUTPATIENT)
Dept: PHYSICAL THERAPY | Age: 49
Setting detail: THERAPIES SERIES
Discharge: HOME | End: 2024-12-20
Payer: COMMERCIAL

## 2024-12-20 PROCEDURE — 97140 MANUAL THERAPY 1/> REGIONS: CPT

## 2024-12-20 PROCEDURE — 97110 THERAPEUTIC EXERCISES: CPT

## 2024-12-20 NOTE — FLOWSHEET NOTE
Training     []  Manual Therapy     []  Ther Activities     []  Aquatics     []  Vasocompression     []  Cervical Traction     []  Other     Total Billable time            Assessment: [x] Progressing toward goals.     [] No change.     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       STG: (to be met in 8 treatments)  ? Pain: No more than 4/10 pain and only as far as posterior L glute consistently to indicate reduced neural irritation  ? ROM: Lumbar AROM no more than 25% limited in all planes with only minimal to no pain at end range  ? Strength: Grossly 4+/5 LLE all major muscle groups to indicate reduced neural irritation and allow improved ability to complete prolonged walking or stair climbing, lifting, etc  ? Function:  Pt able to tolerate sitting evenly on both glutes without increase in pain or needing to change position for at least 20 mins consecutively  Pt able to walk grocery store for 30 mins with no more than mild increase in low back/L glute pain  Pt able to don/doff pants and shoes without pain or compensation reported  Independent with Home Exercise Programs       LTG: (to be met in 12 treatments)  ? Pain: No more than 2/10 pain in low back only reported intermittently and able to self-treat with exercise when pain occurs  ? ROM: Lumbar AROM to WNL all planes and no pain at end range  ? Strength: Grossly 5/5 LLE all major muscle groups to indicate normalized muscle strength post injury, alleviated neural irritation  ? Function:  Pt able to tolerate sitting evenly on both glutes without increase in pain or needing to

## 2024-12-30 ENCOUNTER — HOSPITAL ENCOUNTER (OUTPATIENT)
Dept: PHYSICAL THERAPY | Age: 49
Setting detail: THERAPIES SERIES
Discharge: HOME | End: 2024-12-30
Payer: COMMERCIAL

## 2024-12-30 PROCEDURE — 97110 THERAPEUTIC EXERCISES: CPT

## 2024-12-30 PROCEDURE — 97140 MANUAL THERAPY 1/> REGIONS: CPT

## 2024-12-30 NOTE — FLOWSHEET NOTE
[x] [facility]  Outpatient Rehabilitation &  Therapy  [address]  [phone]  [phone]     Physical Therapy Daily Treatment Note    Date:  2024  Patient Name:  Macarena Espinal      :  1975    MRN: [id number]  Physician: Ronit Dominique APRN - CNP                                     Insurance: [facility] (30vs)  Medical Diagnosis: M47.26 (ICD-10-CM) - Other spondylosis with radiculopathy, lumbar region   Rehab Codes: M54.5, R26.89, M62.81, M62.838  Onset Date: 24                  Next 's appt.: TBD    Visit# / total visits:   Cancels/No Shows: 0    Subjective:    Pain:  [x] Yes  [] No Location: Left low back down to posterior knee   Pain Rating: (0-10 scale) 8.5/10  Pain altered Tx:  [x] No  [] Yes  Action:   Comments: felt relief for a bout 2 days following last Tx. Feeling a bit frustrated overall, with her general health, low back and  L LE pain - feels better whist completing HEP, but pain tends to cyclically return daily. Today was a rough day - pain was shooting all the way down to her posterior heel from her posterior hip. Denies N/T associated with these LE symptoms.     Objective:  Modalities:   Precautions: concordant signs: lumbar flexion, L lumbar sidebend AROM; gait with limp on LLE  Exercises:  Exercise Reps/ Time Weight/ Level Comments    Prone       Prone lying 5x1 min    alternating with SANDIP  TPR throughout piriformis during prone laying X   Prone on elbows - hold 5x1 min              press up 5x10  Minimal range, can not perform from hands due to wrist pain  Intermittent sacral overpressure - improved symptoms  3x30:\" TPR to L piriformis in between sets   X   Prone lying 5x1 min  Alternating with prone press up X          Prone Hip Extension over 2 pillows 2x10 ea  Added 24 X   B HS Curl with Ball Squeeze 3x10  Added 24 X                 Standing lumbar extension 5x10             Seated       Long Seated

## 2025-01-05 ENCOUNTER — HOSPITAL ENCOUNTER (EMERGENCY)
Age: 50
Discharge: HOME | End: 2025-01-05
Attending: STUDENT IN AN ORGANIZED HEALTH CARE EDUCATION/TRAINING PROGRAM
Payer: COMMERCIAL

## 2025-01-05 VITALS
DIASTOLIC BLOOD PRESSURE: 92 MMHG | HEART RATE: 100 BPM | TEMPERATURE: 97.9 F | SYSTOLIC BLOOD PRESSURE: 109 MMHG | RESPIRATION RATE: 18 BRPM | OXYGEN SATURATION: 99 %

## 2025-01-05 DIAGNOSIS — M54.32 SCIATICA OF LEFT SIDE: Primary | ICD-10-CM

## 2025-01-05 PROCEDURE — 99284 EMERGENCY DEPT VISIT MOD MDM: CPT

## 2025-01-05 PROCEDURE — 96372 THER/PROPH/DIAG INJ SC/IM: CPT

## 2025-01-05 PROCEDURE — 6360000002 HC RX W HCPCS

## 2025-01-05 RX ORDER — ORPHENADRINE CITRATE 30 MG/ML
60 INJECTION INTRAMUSCULAR; INTRAVENOUS ONCE
Status: COMPLETED | OUTPATIENT
Start: 2025-01-05 | End: 2025-01-05

## 2025-01-05 RX ORDER — KETOROLAC TROMETHAMINE 30 MG/ML
30 INJECTION, SOLUTION INTRAMUSCULAR; INTRAVENOUS ONCE
Status: COMPLETED | OUTPATIENT
Start: 2025-01-05 | End: 2025-01-05

## 2025-01-05 RX ORDER — PREDNISONE 50 MG/1
50 TABLET ORAL DAILY
Qty: 5 TABLET | Refills: 0 | Status: SHIPPED | OUTPATIENT
Start: 2025-01-05 | End: 2025-01-10

## 2025-01-05 RX ORDER — OXYCODONE HYDROCHLORIDE 5 MG/1
5 TABLET ORAL EVERY 6 HOURS PRN
Qty: 10 TABLET | Refills: 0 | Status: SHIPPED | OUTPATIENT
Start: 2025-01-05 | End: 2025-01-08

## 2025-01-05 RX ADMIN — ORPHENADRINE CITRATE 60 MG: 60 INJECTION INTRAMUSCULAR; INTRAVENOUS at 11:34

## 2025-01-05 RX ADMIN — KETOROLAC TROMETHAMINE 30 MG: 30 INJECTION, SOLUTION INTRAMUSCULAR; INTRAVENOUS at 11:33

## 2025-01-05 ASSESSMENT — PAIN DESCRIPTION - DESCRIPTORS: DESCRIPTORS: ACHING

## 2025-01-05 ASSESSMENT — PAIN DESCRIPTION - LOCATION: LOCATION: LEG

## 2025-01-05 ASSESSMENT — PAIN DESCRIPTION - ORIENTATION: ORIENTATION: LEFT

## 2025-01-05 ASSESSMENT — PAIN - FUNCTIONAL ASSESSMENT: PAIN_FUNCTIONAL_ASSESSMENT: ACTIVITIES ARE NOT PREVENTED

## 2025-01-05 ASSESSMENT — PAIN SCALES - GENERAL: PAINLEVEL_OUTOF10: 10

## 2025-01-05 NOTE — ED TRIAGE NOTES
Pt to ED for L leg pain down entire leg. Pt states physical therapy is not helping. Pt ambulated to room from triage.

## 2025-01-05 NOTE — ED PROVIDER NOTES
[facility]     Emergency Department     Faculty Attestation    I performed a history and physical examination of the patient and discussed management with the resident. I have reviewed and agree with the resident’s findings including all diagnostic interpretations, and treatment plans as written at the time of my review. Any areas of disagreement are noted on the chart. I was personally present for the key portions of any procedures. I have documented in the chart those procedures where I was not present during the key portions. For Physician Assistant/ Nurse Practitioner cases/documentation I have personally evaluated this patient and have completed at least one if not all key elements of the E/M (history, physical exam, and MDM). Additional findings are as noted.    PtName: Macraena Espinal  MRN: [id number]  Birthdate 1975  Date of evaluation: 1/5/25  Note Started: 11:17 AM EST    Primary Care Physician: Akiko Aly MD    Brief HPI:  49-year-old female history of sciatica presents emergency department with acute on chronic sciatic symptoms.  Denies new urinary fecal incontinence, saddle anesthesia, or lower extremity weakness.    Medical Decision Making: Patient is a 49 y.o. female presenting to the emergency department with acute on chronic sciatic. The chart was reviewed for pertinent history relating to the chief complaint.  The patient appears well, no acute distress, vitals are stable.  No new red flag symptoms.  No new trauma.  Plan for symptomatic treatment and follow-up.    All results, including labs (if ordered), imaging (if ordered), and EKGs (if ordered) were independently interpreted by me.  See radiologist report for additional details on imaging studies.      (Please note that portions of this note were completed with a voice recognition program.  Efforts were made to edit the dictations but occasionally words are mis-transcribed.)    Jovani

## 2025-01-05 NOTE — DISCHARGE INSTRUCTIONS
You were seen in the emergency department today for lower back pain.  We gave you 2 medications called Toradol and Norflex here in the emergency department.  I will discharge you with a short course of opioid pain medication which you should use minimally.  We cannot prescribe more than this from the ER.  I will also give you prescription for prednisone 50 mg for 5 days.  This may help your pain as well.  I will give you a referral to a pain management clinic, see their information listed below and please call to arrange an appointment.  Please keep your appointment on Thursday for your MRI.  Please return for any worsening symptoms or concerns.

## 2025-01-05 NOTE — ED PROVIDER NOTES
[facility] EMERGENCY DEPARTMENT  Emergency Department Encounter  Emergency Medicine Resident     Pt Name:Macarena Espinal  MRN: [id number]  Birthdate 1975  Date of evaluation: 1/5/25  PCP:  Akiko Aly MD  Note Started: 11:15 AM EST      CHIEF COMPLAINT       Chief Complaint   Patient presents with    Leg Pain     Left. Down leg       HISTORY OF PRESENT ILLNESS  (Location/Symptom, Timing/Onset, Context/Setting, Quality, Duration, Modifying Factors, Severity.)      Macarena Espinal is a 49 y.o. female who presents with complaints of left sciatica pain.  Patient does have a history of chronic left sciatica.  She is currently seeing physical therapy for this and scheduled to have an lumbar spine MRI on Thursday.  Patient states she has been using a heating pad, lidocaine patches, Tylenol and ibuprofen at home with no relief.  She also has been intermittently using Flexeril which does not help her symptoms either.  She denies any numbness or tingling.  She denies any bowel or bladder dysfunction.  Denies any fever or chills.  Denies any IV drug use.  Denies any history of malignancy or recent steroid or immunosuppressive agent therapy use.  Patient denies any recent trauma or injury.  Patient states that her pain was exacerbated after her physical therapy appointment on Friday when they perform new exercises.    PAST MEDICAL / SURGICAL / SOCIAL / FAMILY HISTORY      has a past medical history of ADHD (attention deficit hyperactivity disorder), Anxiety, GERD (gastroesophageal reflux disease), Hashimoto's disease, Hypothyroidism, Migraine, and Restless legs syndrome.       has a past surgical history that includes Tubal ligation; Partial hysterectomy; Hysterectomy; Upper gastrointestinal endoscopy; Knee arthroscopy (Right, 06/15/2021); Knee arthroscopy (Right, 06/15/2021); and Colonoscopy.      Social History     Socioeconomic History    Marital status: Legally      Spouse name: Not on file    Number

## 2025-01-06 PROBLEM — M71.21 BAKER'S CYST OF KNEE, RIGHT: Status: RESOLVED | Noted: 2021-06-11 | Resolved: 2025-01-06

## 2025-01-15 ENCOUNTER — HOSPITAL ENCOUNTER (OUTPATIENT)
Dept: PHYSICAL THERAPY | Age: 50
Setting detail: THERAPIES SERIES
Discharge: HOME | End: 2025-01-15

## 2025-01-17 ENCOUNTER — TELEPHONE (OUTPATIENT)
Dept: NEUROSURGERY | Age: 50
End: 2025-01-17

## 2025-01-17 NOTE — TELEPHONE ENCOUNTER
Pt reports that insurance denied MRI, and that the back pain she's experiencing is excruating. She would like to know her options are, please advise

## 2025-01-24 NOTE — DISCHARGE SUMMARY
[x] [facility]  Outpatient Rehabilitation &  Therapy  [address]  [phone]  [phone] [] [facility]  Outpatient Rehabilitation &  Therapy  [address]  P: [phone]  F: [phone] [] [facility]  Outpatient Rehabilitation &  Therapy  [address]  P: [phone]  F: [phone] [] [facility]  Outpatient Rehabilitation &  Therapy  [address]  [phone]  [phone] [] [facility]  Outpatient Rehabilitation &  Therapy  [address]   P: [phone]  F: [phone]  [] [facility]  Outpatient Rehabilitation &  Therapy  [address]  P: [phone]  F: [phone] [] [facility]  Outpatient Rehabilitation &  Therapy  [address]  P: [phone]  F: [phone] [] [facility]  Outpatient Rehabilitation &  Therapy  [address]  P: [phone]  F: [phone] [] [facility]  Outpatient Rehabilitation &  Therapy  [address]  P: [phone]  F: [phone]  [] [facility] Outpatient Rehabilitation &  Therapy  [address]  P: [phone]  F: [phone]        Physical Therapy Discharge Note    Date: 2025      Patient: Macarena Espinal  : 1975  MRN: [id number]    Physician: Ronit Dominique APRN - LAURYN                                     Insurance: [facility] (30vs)  Medical Diagnosis: M47.26 (ICD-10-CM) - Other spondylosis with radiculopathy, lumbar region   Rehab Codes: M54.5, R26.89, M62.81, M62.838  Onset Date: 24                            Next 's appt.: TBD     Visit# / total visits:                     Cancels/No Shows: 0  Date of initial visit: 24                Date of final visit: 24      Subjective:  Refer to most recent note in

## 2025-02-03 ENCOUNTER — OFFICE VISIT (OUTPATIENT)
Dept: NEUROSURGERY | Age: 50
End: 2025-02-03
Payer: COMMERCIAL

## 2025-02-03 VITALS
DIASTOLIC BLOOD PRESSURE: 72 MMHG | HEIGHT: 67 IN | HEART RATE: 74 BPM | SYSTOLIC BLOOD PRESSURE: 112 MMHG | BODY MASS INDEX: 21.35 KG/M2 | WEIGHT: 136 LBS

## 2025-02-03 DIAGNOSIS — M47.26 OTHER SPONDYLOSIS WITH RADICULOPATHY, LUMBAR REGION: Primary | ICD-10-CM

## 2025-02-03 PROCEDURE — 3017F COLORECTAL CA SCREEN DOC REV: CPT

## 2025-02-03 PROCEDURE — G8427 DOCREV CUR MEDS BY ELIG CLIN: HCPCS

## 2025-02-03 PROCEDURE — 1036F TOBACCO NON-USER: CPT

## 2025-02-03 PROCEDURE — G8420 CALC BMI NORM PARAMETERS: HCPCS

## 2025-02-03 PROCEDURE — 99213 OFFICE O/P EST LOW 20 MIN: CPT

## 2025-02-03 RX ORDER — METHYLPHENIDATE HYDROCHLORIDE 27 MG/1
TABLET, EXTENDED RELEASE ORAL
COMMUNITY
Start: 2025-01-27

## 2025-02-03 NOTE — PROGRESS NOTES
[place] [address] [facility] [address]  [address] - GROUND FLOOR, [address]  [facility] [address]  Dept: [phone]    Patient:  Macarena Espinal  YOB: 1975  Date: 2/3/25    The patient is a 49 y.o. female who presents today for consult of the following problems:     Chief Complaint   Patient presents with    Follow-up     MRI was denied by insurance; Back pain continues to persist. No numbness, weakness or tingling reported. She states she stopped PT due to having pain after sessions. She wants to know what's going on before she continues because she feels it was making matters worse. MRI was denied due to not having PT completed.          HPI:     Macarena Espinal is a 49 y.o. female who presents for follow up of back pain.    The patient, seen in November, reported constant low back pain radiating to the left leg and foot after a fall three months earlier. The pain was described as dull, burning, and occasionally sharp, worsened by standing and daily activities, and relieved somewhat by position changes and massages. The pain severity was 10/10, affecting her sleep. She had slight relief from a steroid treatment, and chiropractic and massage therapy provided intermittent improvement. No bladder, bowel, or weakness symptoms were noted.    Patient presents today, she reports she feels much better. Patient reports she is back to her baseline. No bladder or bowel incontinence. No saddle anesthesia. Denies numbness, tingling, or weakness.     History:     Past Medical History:   Diagnosis Date    ADHD (attention deficit hyperactivity disorder)     Anxiety     GERD (gastroesophageal reflux disease)     Hashimoto's disease     Hypothyroidism     Migraine     Restless legs syndrome      Past Surgical History:   Procedure Laterality Date    COLONOSCOPY      HYSTERECTOMY (CERVIX STATUS UNKNOWN)      KNEE ARTHROSCOPY Right 06/15/2021

## 2025-03-07 DIAGNOSIS — M47.26 OTHER SPONDYLOSIS WITH RADICULOPATHY, LUMBAR REGION: ICD-10-CM

## 2025-03-19 RX ORDER — LIDOCAINE 50 MG/G
1 PATCH TOPICAL DAILY
Qty: 30 PATCH | Refills: 0 | OUTPATIENT
Start: 2025-03-19 | End: 2025-04-18

## 2025-04-07 DIAGNOSIS — M47.26 OTHER SPONDYLOSIS WITH RADICULOPATHY, LUMBAR REGION: ICD-10-CM

## 2025-04-08 RX ORDER — LIDOCAINE 50 MG/G
1 PATCH TOPICAL DAILY
Qty: 30 PATCH | Refills: 0 | OUTPATIENT
Start: 2025-04-08 | End: 2025-05-08

## 2025-05-04 DIAGNOSIS — M47.26 OTHER SPONDYLOSIS WITH RADICULOPATHY, LUMBAR REGION: ICD-10-CM

## 2025-05-09 RX ORDER — LIDOCAINE 50 MG/G
1 PATCH TOPICAL DAILY
Qty: 30 PATCH | Refills: 0 | OUTPATIENT
Start: 2025-05-09 | End: 2025-06-08

## 2025-06-16 ENCOUNTER — HOSPITAL ENCOUNTER (EMERGENCY)
Age: 50
Discharge: HOME | End: 2025-06-16
Attending: EMERGENCY MEDICINE
Payer: COMMERCIAL

## 2025-06-16 VITALS
RESPIRATION RATE: 18 BRPM | DIASTOLIC BLOOD PRESSURE: 84 MMHG | TEMPERATURE: 97.5 F | SYSTOLIC BLOOD PRESSURE: 148 MMHG | HEART RATE: 90 BPM | OXYGEN SATURATION: 100 %

## 2025-06-16 DIAGNOSIS — S16.1XXA STRAIN OF NECK MUSCLE, INITIAL ENCOUNTER: Primary | ICD-10-CM

## 2025-06-16 PROCEDURE — 96372 THER/PROPH/DIAG INJ SC/IM: CPT | Performed by: EMERGENCY MEDICINE

## 2025-06-16 PROCEDURE — 6370000000 HC RX 637 (ALT 250 FOR IP)

## 2025-06-16 PROCEDURE — 6360000002 HC RX W HCPCS

## 2025-06-16 PROCEDURE — 99284 EMERGENCY DEPT VISIT MOD MDM: CPT | Performed by: EMERGENCY MEDICINE

## 2025-06-16 RX ORDER — KETOROLAC TROMETHAMINE 15 MG/ML
15 INJECTION, SOLUTION INTRAMUSCULAR; INTRAVENOUS ONCE
Status: DISCONTINUED | OUTPATIENT
Start: 2025-06-16 | End: 2025-06-16

## 2025-06-16 RX ORDER — METHOCARBAMOL 100 MG/ML
500 INJECTION, SOLUTION INTRAMUSCULAR; INTRAVENOUS ONCE
Status: COMPLETED | OUTPATIENT
Start: 2025-06-16 | End: 2025-06-16

## 2025-06-16 RX ORDER — KETOROLAC TROMETHAMINE 15 MG/ML
15 INJECTION, SOLUTION INTRAMUSCULAR; INTRAVENOUS ONCE
Status: COMPLETED | OUTPATIENT
Start: 2025-06-16 | End: 2025-06-16

## 2025-06-16 RX ORDER — METHOCARBAMOL 100 MG/ML
1000 INJECTION, SOLUTION INTRAMUSCULAR; INTRAVENOUS ONCE
Status: DISCONTINUED | OUTPATIENT
Start: 2025-06-16 | End: 2025-06-16

## 2025-06-16 RX ORDER — ONDANSETRON 4 MG/1
4 TABLET, ORALLY DISINTEGRATING ORAL EVERY 8 HOURS PRN
Status: DISCONTINUED | OUTPATIENT
Start: 2025-06-16 | End: 2025-06-16 | Stop reason: HOSPADM

## 2025-06-16 RX ORDER — ACETAMINOPHEN 500 MG
1000 TABLET ORAL ONCE
Status: COMPLETED | OUTPATIENT
Start: 2025-06-16 | End: 2025-06-16

## 2025-06-16 RX ORDER — METHOCARBAMOL 750 MG/1
750 TABLET, FILM COATED ORAL 2 TIMES DAILY PRN
Qty: 10 TABLET | Refills: 0 | Status: SHIPPED | OUTPATIENT
Start: 2025-06-16 | End: 2025-06-21

## 2025-06-16 RX ADMIN — ACETAMINOPHEN 1000 MG: 500 TABLET, FILM COATED ORAL at 19:48

## 2025-06-16 RX ADMIN — KETOROLAC TROMETHAMINE 15 MG: 15 INJECTION, SOLUTION INTRAMUSCULAR; INTRAVENOUS at 19:53

## 2025-06-16 RX ADMIN — DICLOFENAC SODIUM 2 G: 10 GEL TOPICAL at 19:54

## 2025-06-16 RX ADMIN — METHOCARBAMOL 500 MG: 100 INJECTION INTRAMUSCULAR; INTRAVENOUS at 20:41

## 2025-06-16 ASSESSMENT — LIFESTYLE VARIABLES
HOW MANY STANDARD DRINKS CONTAINING ALCOHOL DO YOU HAVE ON A TYPICAL DAY: PATIENT DOES NOT DRINK
HOW OFTEN DO YOU HAVE A DRINK CONTAINING ALCOHOL: NEVER

## 2025-06-16 ASSESSMENT — PAIN DESCRIPTION - LOCATION
LOCATION: NECK

## 2025-06-16 ASSESSMENT — PAIN SCALES - GENERAL
PAINLEVEL_OUTOF10: 10

## 2025-06-16 ASSESSMENT — PAIN DESCRIPTION - ORIENTATION
ORIENTATION: LEFT

## 2025-06-16 NOTE — ED PROVIDER NOTES
[facility] EMERGENCY DEPARTMENT  Emergency Department Encounter  Emergency Medicine Resident     Pt Name:Macarena Espinal  MRN: [id number]  Birthdate 1975  Date of evaluation: 6/16/25  PCP:  Akiko Aly MD  Note Started: 7:17 PM EDT      CHIEF COMPLAINT       Chief Complaint   Patient presents with    Neck Pain       HISTORY OF PRESENT ILLNESS  (Location/Symptom, Timing/Onset, Context/Setting, Quality, Duration, Modifying Factors, Severity.)      Macarena Espinal is a 49-year-old female presents to the ED for complaints of left-sided neck pain.  Patient states that the pain has been present for approximately 1 week after she went to a water park and while on an [employer] slide she was ejected out of the end of the slide with more speed than she was anticipating.  She did not hit her head or lose consciousness but felt that she tweaked her neck.  She has been taking over-the-counter pain medications as well as muscle accidents with minimal improvement.  She denies any acute headache or sudden onset headache but states she does have pain rating up the left side of her neck.  Denies any neurological symptoms such as numbness, tingling or decreased strength in her upper or lower extremities.  She does endorse mild decreased range of motion with moving her neck because she feels her muscles twinge.    PAST MEDICAL / SURGICAL / SOCIAL / FAMILY HISTORY      has a past medical history of ADHD (attention deficit hyperactivity disorder), Anxiety, GERD (gastroesophageal reflux disease), Hashimoto's disease, Hypothyroidism, Migraine, and Restless legs syndrome.       has a past surgical history that includes Tubal ligation; Partial hysterectomy; Hysterectomy; Upper gastrointestinal endoscopy; Knee arthroscopy (Right, 06/15/2021); Knee arthroscopy (Right, 06/15/2021); and Colonoscopy.      Social History     Socioeconomic History    Marital status: Legally      Spouse name: Not on file    Number of

## 2025-06-16 NOTE — ED PROVIDER NOTES
[facility]     Emergency Department     Faculty Attestation    I performed a history and physical examination of the patient and discussed management with the resident. I reviewed the resident’s note and agree with the documented findings and plan of care. Any areas of disagreement are noted on the chart. I was personally present for the key portions of any procedures. I have documented in the chart those procedures where I was not present during the key portions. I have reviewed the emergency nurses triage note. I agree with the chief complaint, past medical history, past surgical history, allergies, medications, social and family history as documented unless otherwise noted below. Documentation of the HPI, Physical Exam and Medical Decision Making performed by medical students or scribes is based on my personal performance of the HPI, PE and MDM. For Physician Assistant/ Nurse Practitioner cases/documentation I have personally evaluated this patient and have completed at least one if not all key elements of the E/M (history, physical exam, and MDM). Additional findings are as noted.    Vital signs:   Vitals:    06/16/25 1831   BP: (!) 148/84   Pulse: 90   Resp: 18   Temp: 97.5 °F (36.4 °C)   SpO2: 100%      Left lateral neck discomfort after being on a water slide. No midline tenderness. Obvious muscle spasm on over the SCM on the left. No neurologic deficits. Plan for pain control.             Aliyah Armstrong M.D,  Attending Emergency  Physician           Aliyah Armstrong MD  06/16/25 1956

## 2025-06-16 NOTE — ED NOTES
Patient stated she was at [facility] a week and a half ago where she went down one of the water slides and she injured her neck.   Patient stated the pain is an ache that has not gone away.   Patient stated she has used ice and heat that provided little relief.   Patient stated she has taken tylenol and flexril for relief and it has not helped much. Last dose of both medications was this morning.   Patient in NAD, A/Ox4, resting comfortably on stretcher.

## 2025-06-17 NOTE — DISCHARGE INSTRUCTIONS
-You were seen and evaluated by emergency medicine physicians at [facility].    -Please follow-up with your primary care physician and/or with the referrals to specialist.    -You were diagnosed with: Strain of cervical spinal muscles    - You had improvement in your pain after multimodal pain therapy as well as an intramuscular muscle relaxant.  Your mother will be driving you home.  A prescription of the Robaxin has been sent to your listed pharmacy.  Please take as prescribed and as needed.  Do not operate any motor vehicle or heavy machinery after taking the medication as it can make you sleepy.  Please follow-up with your primary care physician.    -Please return to the Emergency Department if you are experiencing the following symptoms acutely: Neck pain, Headache, fever, chills, nausea, vomiting, chest pain, shortness of breath, abdominal pain, change with urination, change with bowel movements, change in your skin/hair/nail, weakness, fatigue, altered mental status and/or any change from baseline health.    -Thank you for coming to [facility].

## 2025-06-17 NOTE — DISCHARGE INSTR - COC
Continuity of Care Form    Patient Name: Macarena Espinal   :  1975  MRN:  [id number]    Admit date:  2025  Discharge date:  ***    Code Status Order: Prior   Advance Directives:     Admitting Physician:  No admitting provider for patient encounter.  PCP: Akiko Aly MD    Discharging Nurse: ***  Discharging Hospital Unit/Room#:   Discharging Unit Phone Number: ***    Emergency Contact:   Extended Emergency Contact Information  Primary Emergency Contact: Maxx Alonso  Address: [address] [place] [address]  Home Phone: [phone]  Mobile Phone: [phone]  Relation: Domestic Partner    Past Surgical History:  Past Surgical History:   Procedure Laterality Date    COLONOSCOPY      HYSTERECTOMY (CERVIX STATUS UNKNOWN)      KNEE ARTHROSCOPY Right 06/15/2021    RIGHT KNEE ARTHROSCOPY WITH CYST DEBRIDEMENT    KNEE ARTHROSCOPY Right 06/15/2021    RIGHT KNEE ARTHROSCOPY WITH CYST DEBRIDEMENT performed by Valeria Appiah MD at [facility] OR    PARTIAL HYSTERECTOMY (CERVIX NOT REMOVED)      TUBAL LIGATION      UPPER GASTROINTESTINAL ENDOSCOPY         Immunization History:   Immunization History   Administered Date(s) Administered    COVID-19, MODERNA BLUE border, Primary or Immunocompromised, (age 12y+), IM, 100 mcg/0.5mL 2021, 2021, 12/10/2021    Influenza, FLUARIX, FLULAVAL, FLUZONE (age 6 mo+) and AFLURIA, (age 3 y+), Quadv PF, 0.5mL 2018    Influenza, FLUBLOK, (age 18 y+), IM, Trivalent PF, 0.5mL 2024    Influenza, FLUBLOK, (age 18 y+), Quadv PF, 0.5mL 01/10/2023    Influenza, FLUCELVAX, (age 6 mo+), MDCK, Quadv PF, 0.5mL 10/24/2021, 2023    MMR, PRIORIX, M-M-R II, (age 12m+), SC, 0.5mL 2018    Pneumococcal, PCV20, PREVNAR 20, (age 6w+), IM, 0.5mL 2024    TDaP, ADACEL (age 10y-64y), BOOSTRIX (age 10y+), IM, 0.5mL 10/24/2011, 2018    Zoster Recombinant (Shingrix) 2024       Active Problems:  Patient Active

## (undated) DEVICE — TUBING, SUCTION, 3/16" X 10', STRAIGHT: Brand: MEDLINE

## (undated) DEVICE — STERLING XTRASHARP SHAVER GREAT WHITE SHAVER BLADE, 4.2 MM: Brand: STERLING XTRASHARP SHAVER GREAT WHITE

## (undated) DEVICE — BANDAGE COMPR W6INXL5YD SELF ADH COHESIVE CO FLX

## (undated) DEVICE — PADDING UNDERCAST W4INXL4YD COT FBR LO LINTING WYTEX

## (undated) DEVICE — STRAP,POSITIONING,KNEE/BODY,FOAM,4X60": Brand: MEDLINE

## (undated) DEVICE — COVER,MAYO STAND,STERILE: Brand: MEDLINE

## (undated) DEVICE — DRESSING,GAUZE,XEROFORM,CURAD,1"X8",ST: Brand: CURAD

## (undated) DEVICE — GOWN,SIRUS,NONRNF,SETINSLV,XL,20/CS: Brand: MEDLINE

## (undated) DEVICE — CONTAINER,SPECIMEN,4OZ,OR STRL: Brand: MEDLINE

## (undated) DEVICE — DRAPE,EXTREMITY,89X128,STERILE: Brand: MEDLINE

## (undated) DEVICE — GLOVE ORANGE PI 7   MSG9070

## (undated) DEVICE — Z CONVERTED USE 2273232 BANDAGE COMPR W6INXL11YD E KNIT DBL SELF CLSR EZE-BAND

## (undated) DEVICE — DRAPE,U/ SHT,SPLIT,PLAS,STERIL: Brand: MEDLINE

## (undated) DEVICE — PADDING,UNDERCAST,COTTON, 4"X4YD STERILE: Brand: MEDLINE

## (undated) DEVICE — 4.2MM ULTRAFRR: Brand: STERLING ULTRAFRR

## (undated) DEVICE — SOLUTION IV IRRIG LACTATED RINGERS 3000ML 2B7487

## (undated) DEVICE — BANDAGE,ELASTIC,ESMARK,STERILE,6"X9',LF: Brand: MEDLINE

## (undated) DEVICE — DISC ABSRB YEL FLR POLYETH MGMT SUCT QUICKSUITE

## (undated) DEVICE — CHLORAPREP 26ML ORANGE

## (undated) DEVICE — GLOVE ORANGE PI 7 1/2   MSG9075

## (undated) DEVICE — DRESSING TRNSPAR W5XL4.5IN FLM SHT SEMIPERMEABLE WIND

## (undated) DEVICE — 4-PORT MANIFOLD: Brand: NEPTUNE 2

## (undated) DEVICE — STOCKINETTE,IMPERVIOUS,12X48,STERILE: Brand: MEDLINE

## (undated) DEVICE — 3M™ STERI-DRAPE™ U-DRAPE 1015: Brand: STERI-DRAPE™

## (undated) DEVICE — MHPB ARTHROSCOPY PACK: Brand: MEDLINE INDUSTRIES, INC.

## (undated) DEVICE — HYPODERMIC SAFETY NEEDLE: Brand: MAGELLAN